# Patient Record
Sex: FEMALE | Race: WHITE | NOT HISPANIC OR LATINO | Employment: OTHER | URBAN - METROPOLITAN AREA
[De-identification: names, ages, dates, MRNs, and addresses within clinical notes are randomized per-mention and may not be internally consistent; named-entity substitution may affect disease eponyms.]

---

## 2017-01-10 DIAGNOSIS — E78.2 MIXED HYPERLIPIDEMIA: ICD-10-CM

## 2017-01-10 DIAGNOSIS — R80.9 MICROALBUMINURIA: ICD-10-CM

## 2017-01-10 DIAGNOSIS — K21.9 GASTROESOPHAGEAL REFLUX DISEASE WITHOUT ESOPHAGITIS: ICD-10-CM

## 2017-01-10 DIAGNOSIS — I10 ESSENTIAL HYPERTENSION: ICD-10-CM

## 2017-01-10 DIAGNOSIS — M17.0 OSTEOARTHRITIS OF BOTH KNEES, UNSPECIFIED OSTEOARTHRITIS TYPE: ICD-10-CM

## 2017-01-10 DIAGNOSIS — E13.9 LATENT AUTOIMMUNE DIABETES MELLITUS IN ADULTS (HCC): ICD-10-CM

## 2017-01-10 DIAGNOSIS — M47.896 OTHER OSTEOARTHRITIS OF SPINE, LUMBAR REGION: ICD-10-CM

## 2017-01-10 RX ORDER — ETODOLAC 400 MG/1
400 TABLET, EXTENDED RELEASE ORAL DAILY
Qty: 90 TABLET | Refills: 1 | Status: SHIPPED | OUTPATIENT
Start: 2017-01-10 | End: 2017-06-06 | Stop reason: SDUPTHER

## 2017-02-23 ENCOUNTER — OFFICE VISIT (OUTPATIENT)
Dept: ENDOCRINOLOGY | Age: 67
End: 2017-02-23

## 2017-02-23 VITALS
DIASTOLIC BLOOD PRESSURE: 66 MMHG | HEART RATE: 98 BPM | HEIGHT: 62 IN | SYSTOLIC BLOOD PRESSURE: 122 MMHG | OXYGEN SATURATION: 97 % | BODY MASS INDEX: 43.17 KG/M2 | WEIGHT: 234.6 LBS

## 2017-02-23 DIAGNOSIS — R80.9 MICROALBUMINURIA: ICD-10-CM

## 2017-02-23 DIAGNOSIS — E78.5 HYPERLIPIDEMIA, UNSPECIFIED HYPERLIPIDEMIA TYPE: ICD-10-CM

## 2017-02-23 DIAGNOSIS — E55.9 VITAMIN D DEFICIENCY: ICD-10-CM

## 2017-02-23 DIAGNOSIS — I10 ESSENTIAL HYPERTENSION: ICD-10-CM

## 2017-02-23 DIAGNOSIS — K31.84 GASTROPARESIS: ICD-10-CM

## 2017-02-23 DIAGNOSIS — K21.9 GASTROESOPHAGEAL REFLUX DISEASE WITHOUT ESOPHAGITIS: ICD-10-CM

## 2017-02-23 DIAGNOSIS — E13.9 LATENT AUTOIMMUNE DIABETES MELLITUS IN ADULTS (HCC): Primary | ICD-10-CM

## 2017-02-23 PROCEDURE — 99214 OFFICE O/P EST MOD 30 MIN: CPT | Performed by: INTERNAL MEDICINE

## 2017-02-23 RX ORDER — CHOLECALCIFEROL (VITAMIN D3) 125 MCG
CAPSULE ORAL
COMMUNITY
End: 2017-02-24 | Stop reason: DRUGHIGH

## 2017-02-23 NOTE — PROGRESS NOTES
Subjective   Eddie D Parmer is a 66 y.o. female.     HPI Comments: F/u for ANNI, hyperlipidemia  Testing bs 2 x day / last dm eye exam feb 2016/ last dm foot exam today with dr Dorman     Diabetes     Hyperlipidemia        Patient is a 66-year-old female came in for follow-up. She has known diabetes mellitus and was recently found to have elevated MATT antibody. She was started on insulin in 2013 and is presently on Levemir 60 units every evening and NovoLog 25 units at breakfast, 25 units at lunch, and 25 units at supper. Fasting blood sugar runs in the 200's. Suppertime blood sugar runs in 200's.  She denies any hypoglycemic episodes. She has gained 5 lbs since 10/16.  Her last meal was last night.      Her last eye examination was in 2/17 and there was no retinopathy. She has occasional numbness in her left leg when she stands a long time. She has no microalbuminuria urine sample taken last 5/16.       She has hypertension and has been on Avapro 300 mg once a day. She denies any chest pain, pedal edema or orthopnea.       She has hyperlipidemia and has been on pravastatin 80 mg once a day. She denies any myalgia.      She has acid reflux disease which is controlled by Protonix. She has been off Reglan. She was seen by Dr. Britton in the past.      She is currently being treated for depression with Cymbalta at the [facility]. She has completed 5 years of Arimidex.   Last bone density was done at her gynecologist office in 2015 and was normal.      She has chronic low back pain and arthritic pain in both knees and left shoulder. She had a previous right rotator cuff surgery. She has been advised knee replacement by Dr. Gambino. She is seeing Dr. Sander Woods for pain management and is on hydrocodone and etodolac. She had epidural injection in 4/16      She has vitamin D insufficiency and is on Vit D 2000 units/day.   She had a bone density in February 2015 and it was normal.     The following portions of  "the patient's history were reviewed and updated as appropriate: allergies, current medications, past family history, past medical history, past social history, past surgical history and problem list.    Review of Systems   Constitutional: Negative.    HENT: Negative.    Eyes: Negative.    Respiratory: Positive for wheezing.    Cardiovascular: Negative.    Gastrointestinal: Negative.    Endocrine: Negative.    Genitourinary: Negative.    Musculoskeletal: Positive for back pain, joint swelling and neck pain.   Skin: Negative.    Allergic/Immunologic: Negative.    Neurological: Negative.    Hematological: Negative.    Psychiatric/Behavioral: Negative.        Objective      Vitals:    02/23/17 1332   BP: 122/66   BP Location: Right arm   Patient Position: Sitting   Cuff Size: Large Adult   Pulse: 98   SpO2: 97%   Weight: 234 lb 9.6 oz (106 kg)   Height: 62\" (157.5 cm)     Physical Exam   Constitutional: She is oriented to person, place, and time. She appears well-developed and well-nourished. No distress.   HENT:   Head: Normocephalic.   Nose: Nose normal.   Mouth/Throat: No oropharyngeal exudate.   Eyes: Conjunctivae and EOM are normal. Right eye exhibits no discharge. Left eye exhibits no discharge. No scleral icterus.   Neck: Normal range of motion. Neck supple. No JVD present. No tracheal deviation present. No thyromegaly present.   Cardiovascular: Normal rate, regular rhythm and normal heart sounds.  Exam reveals no friction rub.    No murmur heard.  Pulmonary/Chest: Effort normal and breath sounds normal. No respiratory distress. She has no wheezes. She has no rales.   Abdominal: Soft. Bowel sounds are normal. She exhibits no distension and no mass. There is no tenderness.   Musculoskeletal: Normal range of motion. She exhibits no edema or deformity.   No plantar ulcers   Neurological: She is alert and oriented to person, place, and time. She displays normal reflexes. No cranial nerve deficit.   Intact light touch " and vibration on both lower extremities   Skin: Skin is warm and dry. No rash noted. No erythema.   Psychiatric: She has a normal mood and affect. Her behavior is normal.     Office Visit on 10/17/2016   Component Date Value Ref Range Status   • Glucose 10/17/2016 370* 65 - 99 mg/dL Final   • BUN 10/17/2016 20  8 - 23 mg/dL Final   • Creatinine 10/17/2016 1.50* 0.57 - 1.00 mg/dL Final   • eGFR Non African Am 10/17/2016 35* >60 mL/min/1.73 Final   • eGFR African Am 10/17/2016 42* >60 mL/min/1.73 Final   • BUN/Creatinine Ratio 10/17/2016 13.3  7.0 - 25.0 Final   • Sodium 10/17/2016 137  136 - 145 mmol/L Final   • Potassium 10/17/2016 5.5* 3.5 - 5.2 mmol/L Final   • Chloride 10/17/2016 96* 98 - 107 mmol/L Final   • Total CO2 10/17/2016 24.5  22.0 - 29.0 mmol/L Final   • Calcium 10/17/2016 11.0* 8.6 - 10.5 mg/dL Final   • Total Protein 10/17/2016 7.2  6.0 - 8.5 g/dL Final   • Albumin 10/17/2016 4.60  3.50 - 5.20 g/dL Final   • Globulin 10/17/2016 2.6  gm/dL Final   • A/G Ratio 10/17/2016 1.8  g/dL Final   • Total Bilirubin 10/17/2016 0.5  0.1 - 1.2 mg/dL Final   • Alkaline Phosphatase 10/17/2016 196* 39 - 117 U/L Final   • AST (SGOT) 10/17/2016 15  1 - 32 U/L Final   • ALT (SGPT) 10/17/2016 12  1 - 33 U/L Final   • Total Cholesterol 10/17/2016 200  0 - 200 mg/dL Final   • Triglycerides 10/17/2016 187* 0 - 150 mg/dL Final   • HDL Cholesterol 10/17/2016 50  40 - 60 mg/dL Final   • VLDL Cholesterol 10/17/2016 37.4  5 - 40 mg/dL Final   • LDL Cholesterol  10/17/2016 113* 0 - 100 mg/dL Final   • Hemoglobin A1C 10/17/2016 >18.90* 4.80 - 5.60 % Final    Comment: Hemoglobin A1C Ranges:  Increased Risk for Diabetes  5.7% to 6.4%  Diabetes                     >= 6.5%  Diabetic Goal                < 7.0%       Assessment/Plan   Jer was seen today for diabetes and hyperlipidemia.    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  Check blood sugar before meals and at bedtime and call with results in one week.  Continue Avapro.  Continue pravastatin.  Continue Protonix   Continue Vit D    RTC 4 mos

## 2017-02-24 DIAGNOSIS — R79.89 ELEVATED SERUM CREATININE: Primary | ICD-10-CM

## 2017-02-24 LAB
25(OH)D3+25(OH)D2 SERPL-MCNC: 21.8 NG/ML (ref 30–100)
ALBUMIN SERPL-MCNC: 4.5 G/DL (ref 3.5–5.2)
ALBUMIN/GLOB SERPL: 1.4 G/DL
ALP SERPL-CCNC: 164 U/L (ref 39–117)
ALT SERPL-CCNC: 10 U/L (ref 1–33)
AST SERPL-CCNC: 10 U/L (ref 1–32)
BILIRUB SERPL-MCNC: 0.6 MG/DL (ref 0.1–1.2)
BUN SERPL-MCNC: 29 MG/DL (ref 8–23)
BUN/CREAT SERPL: 13.9 (ref 7–25)
CALCIUM SERPL-MCNC: 10.4 MG/DL (ref 8.6–10.5)
CHLORIDE SERPL-SCNC: 100 MMOL/L (ref 98–107)
CHOLEST SERPL-MCNC: 201 MG/DL (ref 0–200)
CO2 SERPL-SCNC: 25.5 MMOL/L (ref 22–29)
CREAT SERPL-MCNC: 2.09 MG/DL (ref 0.57–1)
GLOBULIN SER CALC-MCNC: 3.2 GM/DL
GLUCOSE SERPL-MCNC: 216 MG/DL (ref 65–99)
HBA1C MFR BLD: 8.3 % (ref 4.8–5.6)
HDLC SERPL-MCNC: 56 MG/DL (ref 40–60)
LDLC SERPL CALC-MCNC: 111 MG/DL (ref 0–100)
POTASSIUM SERPL-SCNC: 4.8 MMOL/L (ref 3.5–5.2)
PROT SERPL-MCNC: 7.7 G/DL (ref 6–8.5)
SODIUM SERPL-SCNC: 143 MMOL/L (ref 136–145)
T4 FREE SERPL-MCNC: 1.21 NG/DL (ref 0.93–1.7)
TRIGL SERPL-MCNC: 168 MG/DL (ref 0–150)
TSH SERPL DL<=0.005 MIU/L-ACNC: 0.85 MIU/ML (ref 0.27–4.2)
VLDLC SERPL CALC-MCNC: 33.6 MG/DL (ref 5–40)

## 2017-02-24 RX ORDER — CHOLECALCIFEROL (VITAMIN D3) 125 MCG
5000 CAPSULE ORAL DAILY
Qty: 30 TABLET
Start: 2017-02-24 | End: 2018-02-26

## 2017-02-28 ENCOUNTER — TELEPHONE (OUTPATIENT)
Dept: ENDOCRINOLOGY | Age: 67
End: 2017-02-28

## 2017-02-28 DIAGNOSIS — M17.0 OSTEOARTHRITIS OF BOTH KNEES, UNSPECIFIED OSTEOARTHRITIS TYPE: ICD-10-CM

## 2017-02-28 DIAGNOSIS — E55.9 VITAMIN D DEFICIENCY: ICD-10-CM

## 2017-02-28 DIAGNOSIS — K21.9 GASTROESOPHAGEAL REFLUX DISEASE WITHOUT ESOPHAGITIS: ICD-10-CM

## 2017-02-28 DIAGNOSIS — R80.9 MICROALBUMINURIA: ICD-10-CM

## 2017-02-28 DIAGNOSIS — M47.896 OTHER OSTEOARTHRITIS OF SPINE, LUMBAR REGION: ICD-10-CM

## 2017-02-28 DIAGNOSIS — E13.9 LATENT AUTOIMMUNE DIABETES MELLITUS IN ADULTS (HCC): ICD-10-CM

## 2017-02-28 DIAGNOSIS — I10 ESSENTIAL HYPERTENSION: ICD-10-CM

## 2017-02-28 DIAGNOSIS — K31.84 GASTROPARESIS: ICD-10-CM

## 2017-02-28 DIAGNOSIS — E78.49 OTHER HYPERLIPIDEMIA: ICD-10-CM

## 2017-02-28 RX ORDER — IRBESARTAN 300 MG/1
300 TABLET ORAL DAILY
Qty: 90 TABLET | Refills: 1 | Status: SHIPPED | OUTPATIENT
Start: 2017-02-28 | End: 2017-10-09 | Stop reason: SDUPTHER

## 2017-02-28 RX ORDER — PRAVASTATIN SODIUM 80 MG/1
80 TABLET ORAL NIGHTLY
Qty: 90 TABLET | Refills: 1 | Status: SHIPPED | OUTPATIENT
Start: 2017-02-28 | End: 2017-08-28 | Stop reason: SDUPTHER

## 2017-02-28 NOTE — TELEPHONE ENCOUNTER
----- Message from Va JOHN Glynn sent at 2/28/2017 12:47 PM EST -----  Contact: PATIENT  PATIENT SAID [facility] CALLED HER TODAY SAYING THEY NEEDED INFO FROM PATIENT THAT DR. CAMPO SENT FOR LEVEMIR AND LANTUS.  SHE SAID DR. CAMPO INCREASED LEVEMIR FROM 60 UNITS AT BEDTIME TO 70 UNITS AT BEDTIME.  SHE SAID SHE GOT LEVEMIR SCRIPT LAST WEEK FOR 90 DAY SUPPLY.    SHE SAID DR. CAMPO DID NOT DISCUSS LANTUS WITH HER.    SHE SAID SHE IS TAKING NOVOLOG AND SUPPOSED TO TAKE  25 UNITS BREAKFAST, 25 UNITS  LUNCH, 30 UNITS  AT DINNER.   SHE ONLY EATS 2 MEALS A DAY AND IT MAY BE LUNCH WITH 25 UNITS  AND DINNER, 30 UNITS  OR BRUNCH AND TAKES 25 UNITS  AND DINNER 30 UNITS.   -[phone]

## 2017-03-13 DIAGNOSIS — E55.9 VITAMIN D DEFICIENCY: ICD-10-CM

## 2017-03-13 DIAGNOSIS — M17.0 OSTEOARTHRITIS OF BOTH KNEES, UNSPECIFIED OSTEOARTHRITIS TYPE: ICD-10-CM

## 2017-03-13 DIAGNOSIS — E78.5 HYPERLIPIDEMIA, UNSPECIFIED HYPERLIPIDEMIA TYPE: ICD-10-CM

## 2017-03-13 DIAGNOSIS — E13.9 LATENT AUTOIMMUNE DIABETES MELLITUS IN ADULTS (HCC): ICD-10-CM

## 2017-03-13 DIAGNOSIS — M47.896 OTHER OSTEOARTHRITIS OF SPINE, LUMBAR REGION: ICD-10-CM

## 2017-03-13 DIAGNOSIS — K21.9 GASTROESOPHAGEAL REFLUX DISEASE WITHOUT ESOPHAGITIS: ICD-10-CM

## 2017-03-13 DIAGNOSIS — I10 ESSENTIAL HYPERTENSION: ICD-10-CM

## 2017-03-13 DIAGNOSIS — K31.84 GASTROPARESIS: ICD-10-CM

## 2017-03-13 DIAGNOSIS — R80.9 MICROALBUMINURIA: ICD-10-CM

## 2017-03-20 ENCOUNTER — LAB (OUTPATIENT)
Dept: ENDOCRINOLOGY | Age: 67
End: 2017-03-20

## 2017-03-20 DIAGNOSIS — R79.89 ELEVATED SERUM CREATININE: ICD-10-CM

## 2017-03-20 RX ORDER — DULOXETIN HYDROCHLORIDE 60 MG/1
60 CAPSULE, DELAYED RELEASE ORAL DAILY
Qty: 90 CAPSULE | Refills: 1 | Status: SHIPPED | OUTPATIENT
Start: 2017-03-20 | End: 2017-07-22 | Stop reason: SDUPTHER

## 2017-03-21 LAB
BUN SERPL-MCNC: 23 MG/DL (ref 8–23)
BUN/CREAT SERPL: 14 (ref 7–25)
CALCIUM SERPL-MCNC: 10.1 MG/DL (ref 8.6–10.5)
CHLORIDE SERPL-SCNC: 101 MMOL/L (ref 98–107)
CO2 SERPL-SCNC: 21.8 MMOL/L (ref 22–29)
CREAT SERPL-MCNC: 1.64 MG/DL (ref 0.57–1)
GLUCOSE SERPL-MCNC: 273 MG/DL (ref 65–99)
POTASSIUM SERPL-SCNC: 4.8 MMOL/L (ref 3.5–5.2)
SODIUM SERPL-SCNC: 142 MMOL/L (ref 136–145)

## 2017-03-23 ENCOUNTER — TELEPHONE (OUTPATIENT)
Dept: ENDOCRINOLOGY | Age: 67
End: 2017-03-23

## 2017-03-23 RX ORDER — LANCETS 33 GAUGE
EACH MISCELLANEOUS
Qty: 200 EACH | Refills: 1 | Status: SHIPPED | OUTPATIENT
Start: 2017-03-23 | End: 2018-01-09 | Stop reason: SDUPTHER

## 2017-03-23 NOTE — TELEPHONE ENCOUNTER
----- Message from Daya Jude sent at 3/23/2017 12:49 PM EDT -----  Contact: patient  Needs prescription    ONE TOUCH ULTRA TEST test strip     Sig: USE AS DIRECTED TO TEST BLOOD SUGAR TWO TIMES A DAY      VAN RUGGIERO 33G misc     Sig: USE TO TEST BLOOD SUGAR TWO TIMES A DAY    [facility] [employer] [address] [place], [place] - [address] [facility] AT [address] [place] [address] - [phone]  - [phone]  [phone] (Phone)

## 2017-04-17 ENCOUNTER — TELEPHONE (OUTPATIENT)
Dept: ENDOCRINOLOGY | Age: 67
End: 2017-04-17

## 2017-04-17 DIAGNOSIS — K31.84 GASTROPARESIS: ICD-10-CM

## 2017-04-17 DIAGNOSIS — I10 ESSENTIAL HYPERTENSION: ICD-10-CM

## 2017-04-17 DIAGNOSIS — E78.5 HYPERLIPIDEMIA, UNSPECIFIED HYPERLIPIDEMIA TYPE: ICD-10-CM

## 2017-04-17 DIAGNOSIS — M47.896 OTHER OSTEOARTHRITIS OF SPINE, LUMBAR REGION: ICD-10-CM

## 2017-04-17 DIAGNOSIS — E55.9 VITAMIN D DEFICIENCY: ICD-10-CM

## 2017-04-17 DIAGNOSIS — E13.9 LATENT AUTOIMMUNE DIABETES MELLITUS IN ADULTS (HCC): ICD-10-CM

## 2017-04-17 DIAGNOSIS — R80.9 MICROALBUMINURIA: ICD-10-CM

## 2017-04-17 DIAGNOSIS — M17.0 OSTEOARTHRITIS OF BOTH KNEES, UNSPECIFIED OSTEOARTHRITIS TYPE: ICD-10-CM

## 2017-04-17 DIAGNOSIS — K21.9 GASTROESOPHAGEAL REFLUX DISEASE WITHOUT ESOPHAGITIS: ICD-10-CM

## 2017-04-17 NOTE — TELEPHONE ENCOUNTER
----- Message from Daya Roque sent at 4/17/2017 11:17 AM EDT -----  Contact: patient  novolog will need an appeal    juicealin will be covered

## 2017-04-17 NOTE — TELEPHONE ENCOUNTER
----- Message from Addie Oconnor sent at 4/17/2017 10:26 AM EDT -----  Contact: patient  Patient would like to know if she is concerned about the levomir and lantus, she wants to know which one she is suppose to use to call in a refill. Both are at the Pharmacy.

## 2017-04-25 ENCOUNTER — TELEPHONE (OUTPATIENT)
Dept: ENDOCRINOLOGY | Age: 67
End: 2017-04-25

## 2017-04-25 NOTE — TELEPHONE ENCOUNTER
----- Message from Daya Roque sent at 4/25/2017 10:27 AM EDT -----  Contact: patient  Pt can not afford the insulin yson prescribed her   humalog    Pharmacy has contacted us about what she could afford    Insurance will cover humalin 70/30  She already taken the lantus at the night    Pt needs to know what you can prescribe

## 2017-04-26 RX ORDER — NAPROXEN SODIUM 220 MG
TABLET ORAL
Qty: 400 EACH | Refills: 1 | Status: SHIPPED | OUTPATIENT
Start: 2017-04-26 | End: 2017-05-02 | Stop reason: SDUPTHER

## 2017-05-02 ENCOUNTER — TELEPHONE (OUTPATIENT)
Dept: ENDOCRINOLOGY | Age: 67
End: 2017-05-02

## 2017-05-02 RX ORDER — NAPROXEN SODIUM 220 MG
TABLET ORAL
Qty: 400 EACH | Refills: 1 | Status: SHIPPED | OUTPATIENT
Start: 2017-05-02 | End: 2017-05-16 | Stop reason: SDUPTHER

## 2017-05-08 RX ORDER — PANTOPRAZOLE SODIUM 40 MG/1
40 TABLET, DELAYED RELEASE ORAL DAILY
Qty: 90 TABLET | Refills: 0 | Status: SHIPPED | OUTPATIENT
Start: 2017-05-08 | End: 2017-07-18 | Stop reason: SDUPTHER

## 2017-05-11 ENCOUNTER — APPOINTMENT (OUTPATIENT)
Dept: WOMENS IMAGING | Facility: HOSPITAL | Age: 67
End: 2017-05-11

## 2017-05-11 PROCEDURE — 77063 BREAST TOMOSYNTHESIS BI: CPT | Performed by: RADIOLOGY

## 2017-05-11 PROCEDURE — G0202 SCR MAMMO BI INCL CAD: HCPCS | Performed by: RADIOLOGY

## 2017-05-16 RX ORDER — NAPROXEN SODIUM 220 MG
TABLET ORAL
Qty: 300 EACH | Refills: 1 | Status: SHIPPED | OUTPATIENT
Start: 2017-05-16 | End: 2018-08-06 | Stop reason: SDUPTHER

## 2017-06-05 ENCOUNTER — OFFICE VISIT (OUTPATIENT)
Dept: ENDOCRINOLOGY | Age: 67
End: 2017-06-05

## 2017-06-05 VITALS
HEART RATE: 115 BPM | WEIGHT: 233.4 LBS | OXYGEN SATURATION: 99 % | SYSTOLIC BLOOD PRESSURE: 142 MMHG | BODY MASS INDEX: 42.95 KG/M2 | DIASTOLIC BLOOD PRESSURE: 76 MMHG | HEIGHT: 62 IN

## 2017-06-05 DIAGNOSIS — E13.9 LATENT AUTOIMMUNE DIABETES MELLITUS IN ADULTS (HCC): Primary | ICD-10-CM

## 2017-06-05 DIAGNOSIS — K21.9 GASTROESOPHAGEAL REFLUX DISEASE WITHOUT ESOPHAGITIS: ICD-10-CM

## 2017-06-05 DIAGNOSIS — R80.9 MICROALBUMINURIA: ICD-10-CM

## 2017-06-05 DIAGNOSIS — I10 ESSENTIAL HYPERTENSION: ICD-10-CM

## 2017-06-05 DIAGNOSIS — E78.5 HYPERLIPIDEMIA, UNSPECIFIED HYPERLIPIDEMIA TYPE: ICD-10-CM

## 2017-06-05 DIAGNOSIS — E55.9 VITAMIN D DEFICIENCY: ICD-10-CM

## 2017-06-05 PROCEDURE — 99214 OFFICE O/P EST MOD 30 MIN: CPT | Performed by: INTERNAL MEDICINE

## 2017-06-05 RX ORDER — NYSTATIN AND TRIAMCINOLONE ACETONIDE 100000; 1 [USP'U]/G; MG/G
OINTMENT TOPICAL
Refills: 5 | COMMUNITY
Start: 2017-03-02 | End: 2017-06-05

## 2017-06-05 NOTE — PROGRESS NOTES
Subjective   Eddie D Parmer is a 67 y.o. female.     HPI Comments: F/u for ANNI ,hyperlipidemia,hypertension,depression, vitamin d def / testing bs 2 x day / last dm eye exam 2/17/ last dm foot exam 2/23/17 with dr Dorman     Diabetes     Hyperlipidemia   Associated symptoms include shortness of breath.   Hypertension   Associated symptoms include neck pain and shortness of breath.      Patient is a 66-year-old female came in for follow-up. She has known diabetes mellitus and was recently found to have elevated MATT antibody. She was started on insulin in 2013 and is on Levemir 65 units every evening and Humalog 30 units at breakfast, 30 units at lunch, and 30 units at supper. Fasting blood sugar 112-270.  Start blood sugar runs between 98 2 252.  Suppertime blood sugar runs between . She denies any hypoglycemic episodes. She has gained 5 lbs since 10/16. Her last meal was last night.      Her last eye examination was in 2/17 and has bleeding on left eye which required an injection. She has occasional numbness in her left leg when she stands a long time. She has no microalbuminuria on urine sample taken last 5/16.       She has hypertension and has been on Avapro 300 mg once a day. She denies any chest pain, pedal edema or orthopnea.       She has hyperlipidemia and has been on pravastatin 80 mg once a day. She denies any myalgia.      She has acid reflux disease which is controlled by Protonix. She has been off Reglan. She was seen by Dr. Britton in the past.      She is currently being treated for depression with Cymbalta at the [facility]. She has completed 5 years of Arimidex.  Last bone density was done at her gynecologist office in 2015 and was normal.  She is complaining of intermittent hot flashes.      She has chronic low back pain and arthritic pain in both knees and left shoulder. She had a previous right rotator cuff surgery. She has been advised knee replacement by Dr. Gambino. She is  "seeing Dr. Sander Woods for pain management and is on hydrocodone and etodolac. She had epidural injection in 4/16      She has vitamin D insufficiency and is on Vit D 2000 units/day.  She had a bone density in February 2015 and it was normal.        The following portions of the patient's history were reviewed and updated as appropriate: allergies, current medications, past family history, past medical history, past social history, past surgical history and problem list.    Review of Systems   Constitutional: Negative.    HENT: Negative.    Eyes: Positive for visual disturbance ( seeing dr Ann ).   Respiratory: Positive for shortness of breath.    Cardiovascular: Negative.    Gastrointestinal: Positive for nausea.   Endocrine: Negative.    Genitourinary: Negative.    Musculoskeletal: Positive for back pain, joint swelling and neck pain.   Skin: Negative.    Allergic/Immunologic: Negative.    Neurological: Positive for numbness ( left leg ).   Hematological: Negative.        Objective      Vitals:    06/05/17 1510   BP: 142/76   BP Location: Right arm   Patient Position: Sitting   Cuff Size: Large Adult   Pulse: 115   SpO2: 99%   Weight: 233 lb 6.4 oz (106 kg)   Height: 62\" (157.5 cm)     Physical Exam   Constitutional: She is oriented to person, place, and time. She appears well-developed and well-nourished. No distress.   HENT:   Head: Normocephalic.   Nose: Nose normal.   Mouth/Throat: No oropharyngeal exudate.   Eyes: Conjunctivae and EOM are normal. Right eye exhibits no discharge. Left eye exhibits no discharge. No scleral icterus.   Neck: Neck supple. No JVD present. No tracheal deviation present. No thyromegaly present.   Cardiovascular: Normal rate, regular rhythm, normal heart sounds and intact distal pulses.  Exam reveals no gallop and no friction rub.    No murmur heard.  Pulmonary/Chest: Effort normal and breath sounds normal. No respiratory distress. She has no wheezes. She has no rales. "   Abdominal: Soft. Bowel sounds are normal. She exhibits no distension and no mass. There is no tenderness.   Musculoskeletal: Normal range of motion. She exhibits no edema, tenderness or deformity.   Lymphadenopathy:     She has no cervical adenopathy.   Neurological: She is alert and oriented to person, place, and time. She displays normal reflexes.   Intact light touch   Skin: Skin is warm and dry.   Psychiatric: She has a normal mood and affect. Her behavior is normal.     Lab on 03/20/2017   Component Date Value Ref Range Status   • Glucose 03/20/2017 273* 65 - 99 mg/dL Final   • BUN 03/20/2017 23  8 - 23 mg/dL Final   • Creatinine 03/20/2017 1.64* 0.57 - 1.00 mg/dL Final   • eGFR Non African Am 03/20/2017 31* >60 mL/min/1.73 Final   • eGFR African Am 03/20/2017 38* >60 mL/min/1.73 Final   • BUN/Creatinine Ratio 03/20/2017 14.0  7.0 - 25.0 Final   • Sodium 03/20/2017 142  136 - 145 mmol/L Final   • Potassium 03/20/2017 4.8  3.5 - 5.2 mmol/L Final   • Chloride 03/20/2017 101  98 - 107 mmol/L Final   • Total CO2 03/20/2017 21.8* 22.0 - 29.0 mmol/L Final   • Calcium 03/20/2017 10.1  8.6 - 10.5 mg/dL Final     Assessment/Plan   Jer was seen today for diabetes, hyperlipidemia, hypertension and vitamin d deficiency.    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    Decrease Humalog to 30 units at breakfast, 25 units at lunch and 30 units at supper.  Continue Avapro 300 mg once a day.  Continue pravastatin.  Continue Protonix.  Continue Cymbalta.  Continue hydrocodone  etodolac.  Continue vitamin D.    RTC 4 mos.

## 2017-06-06 DIAGNOSIS — E78.2 MIXED HYPERLIPIDEMIA: ICD-10-CM

## 2017-06-06 DIAGNOSIS — E13.9 LATENT AUTOIMMUNE DIABETES MELLITUS IN ADULTS (HCC): ICD-10-CM

## 2017-06-06 DIAGNOSIS — M47.896 OTHER OSTEOARTHRITIS OF SPINE, LUMBAR REGION: ICD-10-CM

## 2017-06-06 DIAGNOSIS — R80.9 MICROALBUMINURIA: ICD-10-CM

## 2017-06-06 DIAGNOSIS — K21.9 GASTROESOPHAGEAL REFLUX DISEASE WITHOUT ESOPHAGITIS: ICD-10-CM

## 2017-06-06 DIAGNOSIS — I10 ESSENTIAL HYPERTENSION: ICD-10-CM

## 2017-06-06 DIAGNOSIS — M17.0 OSTEOARTHRITIS OF BOTH KNEES, UNSPECIFIED OSTEOARTHRITIS TYPE: ICD-10-CM

## 2017-06-06 LAB
25(OH)D3+25(OH)D2 SERPL-MCNC: 39.5 NG/ML (ref 30–100)
ALBUMIN SERPL-MCNC: 4.9 G/DL (ref 3.5–5.2)
ALBUMIN/CREAT UR: 27.6 MG/G CREAT (ref 0–30)
ALBUMIN/GLOB SERPL: 1.4 G/DL
ALP SERPL-CCNC: 175 U/L (ref 39–117)
ALT SERPL-CCNC: 12 U/L (ref 1–33)
AST SERPL-CCNC: 23 U/L (ref 1–32)
BILIRUB SERPL-MCNC: 0.4 MG/DL (ref 0.1–1.2)
BUN SERPL-MCNC: 22 MG/DL (ref 8–23)
BUN/CREAT SERPL: 13.3 (ref 7–25)
CALCIUM SERPL-MCNC: 11 MG/DL (ref 8.6–10.5)
CHLORIDE SERPL-SCNC: 101 MMOL/L (ref 98–107)
CHOLEST SERPL-MCNC: 186 MG/DL (ref 0–200)
CO2 SERPL-SCNC: 21.9 MMOL/L (ref 22–29)
CREAT SERPL-MCNC: 1.66 MG/DL (ref 0.57–1)
CREAT UR-MCNC: 373.3 MG/DL
GLOBULIN SER CALC-MCNC: 3.5 GM/DL
GLUCOSE SERPL-MCNC: 138 MG/DL (ref 65–99)
HBA1C MFR BLD: 6.38 % (ref 4.8–5.6)
HDLC SERPL-MCNC: 55 MG/DL (ref 40–60)
LDLC SERPL CALC-MCNC: 99 MG/DL (ref 0–100)
MICROALBUMIN UR-MCNC: 102.9 UG/ML
POTASSIUM SERPL-SCNC: 4.4 MMOL/L (ref 3.5–5.2)
PROT SERPL-MCNC: 8.4 G/DL (ref 6–8.5)
SODIUM SERPL-SCNC: 140 MMOL/L (ref 136–145)
T4 FREE SERPL-MCNC: 1.36 NG/DL (ref 0.93–1.7)
TRIGL SERPL-MCNC: 162 MG/DL (ref 0–150)
TSH SERPL DL<=0.005 MIU/L-ACNC: 1.75 MIU/ML (ref 0.27–4.2)
VLDLC SERPL CALC-MCNC: 32.4 MG/DL (ref 5–40)

## 2017-06-06 RX ORDER — ETODOLAC 400 MG/1
TABLET, EXTENDED RELEASE ORAL
Qty: 90 TABLET | Refills: 1 | Status: SHIPPED | OUTPATIENT
Start: 2017-06-06 | End: 2017-10-24 | Stop reason: SDUPTHER

## 2017-06-08 ENCOUNTER — TELEPHONE (OUTPATIENT)
Dept: ENDOCRINOLOGY | Age: 67
End: 2017-06-08

## 2017-06-08 NOTE — TELEPHONE ENCOUNTER
----- Message from Carolann Rubio sent at 6/8/2017 10:21 AM EDT -----  Contact: PATIENT  THE PATIENT WAS JUST SWITCHED TO HUMALOG. SHE IS ABOUT TO GO ON VACATION AND IS WONDERING IF SHE WILL BE ABLE TO HAVE THE OCCASIONAL LORENA WHILE TAKING THE HUMALOG. PLEASE CALL THE PATIENT TO ADVISE.

## 2017-07-18 RX ORDER — PANTOPRAZOLE SODIUM 40 MG/1
TABLET, DELAYED RELEASE ORAL
Qty: 90 TABLET | Refills: 1 | Status: SHIPPED | OUTPATIENT
Start: 2017-07-18 | End: 2017-12-25 | Stop reason: SDUPTHER

## 2017-07-24 RX ORDER — DULOXETIN HYDROCHLORIDE 60 MG/1
CAPSULE, DELAYED RELEASE ORAL
Qty: 90 CAPSULE | Refills: 1 | Status: SHIPPED | OUTPATIENT
Start: 2017-07-24 | End: 2018-02-09 | Stop reason: SDUPTHER

## 2017-08-29 RX ORDER — PRAVASTATIN SODIUM 80 MG/1
TABLET ORAL
Qty: 90 TABLET | Refills: 1 | Status: SHIPPED | OUTPATIENT
Start: 2017-08-29 | End: 2018-02-21 | Stop reason: SDUPTHER

## 2017-10-09 DIAGNOSIS — K31.84 GASTROPARESIS: ICD-10-CM

## 2017-10-09 DIAGNOSIS — M17.0 OSTEOARTHRITIS OF BOTH KNEES, UNSPECIFIED OSTEOARTHRITIS TYPE: ICD-10-CM

## 2017-10-09 DIAGNOSIS — E55.9 VITAMIN D DEFICIENCY: ICD-10-CM

## 2017-10-09 DIAGNOSIS — E13.9 LATENT AUTOIMMUNE DIABETES MELLITUS IN ADULTS (HCC): ICD-10-CM

## 2017-10-09 DIAGNOSIS — E78.49 OTHER HYPERLIPIDEMIA: ICD-10-CM

## 2017-10-09 DIAGNOSIS — M47.896 OTHER OSTEOARTHRITIS OF SPINE, LUMBAR REGION: ICD-10-CM

## 2017-10-09 DIAGNOSIS — R80.9 MICROALBUMINURIA: ICD-10-CM

## 2017-10-09 DIAGNOSIS — E78.5 HYPERLIPIDEMIA, UNSPECIFIED HYPERLIPIDEMIA TYPE: ICD-10-CM

## 2017-10-09 DIAGNOSIS — K21.9 GASTROESOPHAGEAL REFLUX DISEASE WITHOUT ESOPHAGITIS: ICD-10-CM

## 2017-10-09 DIAGNOSIS — I10 ESSENTIAL HYPERTENSION: ICD-10-CM

## 2017-10-09 RX ORDER — IRBESARTAN 300 MG/1
TABLET ORAL
Qty: 90 TABLET | Refills: 1 | Status: SHIPPED | OUTPATIENT
Start: 2017-10-09 | End: 2018-02-21 | Stop reason: SDUPTHER

## 2017-10-09 RX ORDER — MONTELUKAST SODIUM 10 MG/1
TABLET ORAL
Qty: 90 TABLET | Refills: 1 | Status: SHIPPED | OUTPATIENT
Start: 2017-10-09 | End: 2018-01-29 | Stop reason: SDUPTHER

## 2017-10-13 ENCOUNTER — OFFICE VISIT (OUTPATIENT)
Dept: ENDOCRINOLOGY | Age: 67
End: 2017-10-13

## 2017-10-13 VITALS
WEIGHT: 234.6 LBS | HEART RATE: 100 BPM | DIASTOLIC BLOOD PRESSURE: 90 MMHG | OXYGEN SATURATION: 98 % | BODY MASS INDEX: 43.17 KG/M2 | SYSTOLIC BLOOD PRESSURE: 149 MMHG | HEIGHT: 62 IN

## 2017-10-13 DIAGNOSIS — Z23 NEED FOR IMMUNIZATION AGAINST INFLUENZA: Primary | ICD-10-CM

## 2017-10-13 DIAGNOSIS — R80.9 MICROALBUMINURIA: ICD-10-CM

## 2017-10-13 DIAGNOSIS — Z11.59 ENCOUNTER FOR HEPATITIS C SCREENING TEST FOR LOW RISK PATIENT: ICD-10-CM

## 2017-10-13 DIAGNOSIS — E13.9 LATENT AUTOIMMUNE DIABETES MELLITUS IN ADULTS (HCC): Primary | ICD-10-CM

## 2017-10-13 DIAGNOSIS — I10 ESSENTIAL HYPERTENSION: ICD-10-CM

## 2017-10-13 DIAGNOSIS — J30.9 ALLERGIC RHINITIS, UNSPECIFIED CHRONICITY, UNSPECIFIED SEASONALITY, UNSPECIFIED TRIGGER: ICD-10-CM

## 2017-10-13 DIAGNOSIS — Z23 NEED FOR IMMUNIZATION AGAINST INFLUENZA: ICD-10-CM

## 2017-10-13 DIAGNOSIS — K21.9 GASTROESOPHAGEAL REFLUX DISEASE, ESOPHAGITIS PRESENCE NOT SPECIFIED: ICD-10-CM

## 2017-10-13 DIAGNOSIS — M47.896 OTHER OSTEOARTHRITIS OF SPINE, LUMBAR REGION: ICD-10-CM

## 2017-10-13 DIAGNOSIS — M48.061 SPINAL STENOSIS OF LUMBAR REGION, UNSPECIFIED WHETHER NEUROGENIC CLAUDICATION PRESENT: ICD-10-CM

## 2017-10-13 DIAGNOSIS — K21.9 GASTROESOPHAGEAL REFLUX DISEASE WITHOUT ESOPHAGITIS: ICD-10-CM

## 2017-10-13 DIAGNOSIS — E55.9 VITAMIN D DEFICIENCY: ICD-10-CM

## 2017-10-13 DIAGNOSIS — M17.0 OSTEOARTHRITIS OF BOTH KNEES, UNSPECIFIED OSTEOARTHRITIS TYPE: ICD-10-CM

## 2017-10-13 DIAGNOSIS — E78.5 HYPERLIPIDEMIA, UNSPECIFIED HYPERLIPIDEMIA TYPE: ICD-10-CM

## 2017-10-13 DIAGNOSIS — K31.84 GASTROPARESIS: ICD-10-CM

## 2017-10-13 PROCEDURE — 90471 IMMUNIZATION ADMIN: CPT | Performed by: INTERNAL MEDICINE

## 2017-10-13 PROCEDURE — 99214 OFFICE O/P EST MOD 30 MIN: CPT | Performed by: INTERNAL MEDICINE

## 2017-10-13 PROCEDURE — 90662 IIV NO PRSV INCREASED AG IM: CPT | Performed by: INTERNAL MEDICINE

## 2017-10-13 RX ORDER — CLOBETASOL PROPIONATE 0.46 MG/ML
SOLUTION TOPICAL
Refills: 3 | COMMUNITY
Start: 2017-08-17 | End: 2018-02-26

## 2017-10-13 RX ORDER — GABAPENTIN 300 MG/1
300 CAPSULE ORAL 3 TIMES DAILY
Qty: 90 CAPSULE | Refills: 2 | Status: SHIPPED | OUTPATIENT
Start: 2017-10-13 | End: 2018-01-29 | Stop reason: SDUPTHER

## 2017-10-13 RX ORDER — SYRINGE-NEEDLE,INSULIN,0.5 ML 27GX1/2"
SYRINGE, EMPTY DISPOSABLE MISCELLANEOUS
Qty: 100 EACH | Refills: 2 | Status: SHIPPED | OUTPATIENT
Start: 2017-10-13

## 2017-10-13 RX ORDER — OXYCODONE AND ACETAMINOPHEN 10; 325 MG/1; MG/1
1 TABLET ORAL EVERY 8 HOURS PRN
COMMUNITY
End: 2020-03-03

## 2017-10-13 RX ORDER — DOXYCYCLINE 100 MG/1
TABLET ORAL
Refills: 3 | COMMUNITY
Start: 2017-08-14 | End: 2017-10-13

## 2017-10-13 RX ORDER — MOMETASONE FUROATE 50 UG/1
2 SPRAY, METERED NASAL DAILY
Qty: 3 EACH | Refills: 3 | Status: SHIPPED | OUTPATIENT
Start: 2017-10-13 | End: 2019-02-12 | Stop reason: SDUPTHER

## 2017-10-13 NOTE — PROGRESS NOTES
Subjective   Eddie D Parmer is a 67 y.o. female.     HPI Comments: F/u for ANNI , hyperlipidemia, hypertension, depression, vitamin d def /testing bs 2 x day / last dm eye exam 2/17/ last dm foot exam 2-23-17 with dr Reed     Diabetes     Hyperlipidemia     Hypertension     Depression      Patient is a 67-year-old female came in for follow-up. She has known diabetes mellitus and has elevated MATT antibody. She was started on insulin in 2013 and is on Lantus 65 units every evening and Humalog 30 units at breakfast, 30 units at lunch, and 30 units at supper. Fasting blood sugar .  Lunchtime blood sugar runs between .  Suppertime blood sugar runs between . She denies any hypoglycemic episodes. She is having difficulty paying for her medication.  She has significant weight change since June 2017.. Her last meal was last night.      Her last eye examination was in 9/17 and has bleeding on left eye and last had eye injection last month. She has occasional numbness in her left lateral thigh when she stands a long time. She has no microalbuminuria on urine sample taken last 5/16.       She has degenerative spondylolisthesis disease with spinal stenosis and degenerative scoliosis.  She was seen by Dr. Sheth in November 2016 and was sent to pain management.  She is now seeing a chiropractor.    She has bilateral degenerative arthritis and was advised knee replacement by Dr. Gambino.  She is seeing Dr. Boucher for pain management and is on oxycodone and etodolac.  Her last epidural injection was in April 2016.    She has hypertension and has been on Avapro 300 mg once a day. She denies any chest pain, pedal edema or orthopnea.       She has hyperlipidemia and has been on pravastatin 80 mg once a day. She denies any myalgia.      She has acid reflux disease which is controlled by Protonix. She has been off Reglan. She was seen by Dr. Britton in the past.      She is being treated for depression with Cymbalta at the  [facility]. She has completed 5 years of Arimidex.  Last bone density was done at her gynecologist office in 2015 and was normal.  She is complaining of intermittent hot flashes.      She has vitamin D insufficiency and is on Vit D 2000 units/day.  She had a bone density in February 2015 and it was normal.    The following portions of the patient's history were reviewed and updated as appropriate: allergies, current medications, past family history, past medical history, past social history, past surgical history and problem list.    Review of Systems   Constitutional: Negative.    HENT: Negative.    Eyes: Negative.    Respiratory: Negative.    Cardiovascular: Negative.    Gastrointestinal: Negative.    Endocrine: Negative.    Genitourinary: Negative.    Musculoskeletal: Positive for back pain and joint swelling.   Skin: Negative.    Allergic/Immunologic: Negative.    Neurological: Positive for numbness ( legs ).   Hematological: Negative.    Psychiatric/Behavioral: Negative.        Objective      Vitals:    10/13/17 1130   BP: 149/90   BP Location: Right arm   Patient Position: Sitting   Cuff Size: Large Adult   Pulse: 100   SpO2: 98%   Weight: 234 lb 9.6 oz (106 kg)   Height: 62\" (157.5 cm)     Physical Exam   Constitutional: She is oriented to person, place, and time. She appears well-developed and well-nourished. No distress.   HENT:   Head: Normocephalic.   Nose: Nose normal.   Mouth/Throat: No oropharyngeal exudate.   Eyes: Conjunctivae and EOM are normal. Right eye exhibits no discharge. Left eye exhibits no discharge. No scleral icterus.   Neck: Neck supple. No JVD present. No tracheal deviation present. No thyromegaly present.   Cardiovascular: Normal rate, regular rhythm, normal heart sounds and intact distal pulses.  Exam reveals no gallop and no friction rub.    No murmur heard.  Pulmonary/Chest: Effort normal and breath sounds normal. No respiratory distress. She has no wheezes. She has no " rales.   Abdominal: Soft. Bowel sounds are normal. She exhibits no distension and no mass. There is no tenderness.   Musculoskeletal: Normal range of motion. She exhibits no edema, tenderness or deformity.   Lymphadenopathy:     She has no cervical adenopathy.   Neurological: She is alert and oriented to person, place, and time. She displays normal reflexes. Coordination normal.   Intact light touch on lower ext.  Negative SLR.     Skin: Skin is warm and dry. No rash noted. No erythema. No pallor.   Psychiatric: She has a normal mood and affect. Her behavior is normal.     Office Visit on 06/05/2017   Component Date Value Ref Range Status   • Glucose 06/05/2017 138* 65 - 99 mg/dL Final   • BUN 06/05/2017 22  8 - 23 mg/dL Final   • Creatinine 06/05/2017 1.66* 0.57 - 1.00 mg/dL Final   • eGFR Non African Am 06/05/2017 31* >60 mL/min/1.73 Final   • eGFR African Am 06/05/2017 38* >60 mL/min/1.73 Final   • BUN/Creatinine Ratio 06/05/2017 13.3  7.0 - 25.0 Final   • Sodium 06/05/2017 140  136 - 145 mmol/L Final   • Potassium 06/05/2017 4.4  3.5 - 5.2 mmol/L Final   • Chloride 06/05/2017 101  98 - 107 mmol/L Final   • Total CO2 06/05/2017 21.9* 22.0 - 29.0 mmol/L Final   • Calcium 06/05/2017 11.0* 8.6 - 10.5 mg/dL Final   • Total Protein 06/05/2017 8.4  6.0 - 8.5 g/dL Final   • Albumin 06/05/2017 4.90  3.50 - 5.20 g/dL Final   • Globulin 06/05/2017 3.5  gm/dL Final   • A/G Ratio 06/05/2017 1.4  g/dL Final   • Total Bilirubin 06/05/2017 0.4  0.1 - 1.2 mg/dL Final   • Alkaline Phosphatase 06/05/2017 175* 39 - 117 U/L Final   • AST (SGOT) 06/05/2017 23  1 - 32 U/L Final   • ALT (SGPT) 06/05/2017 12  1 - 33 U/L Final   • Total Cholesterol 06/05/2017 186  0 - 200 mg/dL Final   • Triglycerides 06/05/2017 162* 0 - 150 mg/dL Final   • HDL Cholesterol 06/05/2017 55  40 - 60 mg/dL Final   • VLDL Cholesterol 06/05/2017 32.4  5 - 40 mg/dL Final   • LDL Cholesterol  06/05/2017 99  0 - 100 mg/dL Final   • Hemoglobin A1C 06/05/2017 6.38*  "4.80 - 5.60 % Final    Comment: Hemoglobin A1C Ranges:  Increased Risk for Diabetes  5.7% to 6.4%  Diabetes                     >= 6.5%  Diabetic Goal                < 7.0%     • TSH 06/05/2017 1.750  0.270 - 4.200 mIU/mL Final   • Free T4 06/05/2017 1.36  0.93 - 1.70 ng/dL Final   • Creatinine, Urine 06/05/2017 373.3  Not Estab. mg/dL Final   • Microalbumin, Urine 06/05/2017 102.9  Not Estab. ug/mL Final   • Microalbumin/Creatinine Ratio Urine 06/05/2017 27.6  0.0 - 30.0 mg/g creat Final   • 25 Hydroxy, Vitamin D 06/05/2017 39.5  30.0 - 100.0 ng/mL Final    Comment: Reference Range for Total Vitamin D 25(OH)  Deficiency    <20.0 ng/mL  Insufficiency 21-29 ng/mL  Sufficiency    ng/mL  Toxicity      >100 ng/ml          Assessment/Plan   Jer was seen today for diabetes, hyperlipidemia, hypertension, vitamin d deficiency and depression.    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  -     Insulin Syringe-Needle U-100 (SAFETY INSULIN SYRINGES) 30G X 1/2\" 1 ML misc; Using 4 daily  -     Comprehensive Metabolic Panel  -     Hemoglobin A1c  -     Vitamin D 25 Hydroxy    Essential hypertension    Hyperlipidemia, unspecified hyperlipidemia type  -     Lipid Panel    Gastroesophageal reflux disease, esophagitis presence not specified    Gastroparesis    Spinal stenosis of lumbar region, unspecified whether neurogenic claudication present    Vitamin D deficiency  -     PTH, Intact  -     Vitamin D 25 Hydroxy    Microalbuminuria    Gastroesophageal reflux disease without esophagitis    Osteoarthritis of both knees, unspecified osteoarthritis type    Other osteoarthritis of spine, lumbar region    Allergic rhinitis, unspecified chronicity, unspecified seasonality, unspecified trigger  -     mometasone (NASONEX) 50 MCG/ACT nasal spray; 2 sprays into each nostril Daily.    Encounter for hepatitis C screening test for low risk patient  -     Hepatitis C Antibody      Continue " Lantus and Humalog.  Check hemoglobin A1c.  Continue pravastatin 80 mg once a day.  Check lipid profile.  Continue Avapro.  Continue Protonix.  Continue Cymbalta.    Gabapentin 300 mg every evening.  Follow-up with pain management.  Check vitamin D levels.     Send copy of my notes and labs to Dr. Sheth, Dr. Matthew and Dr. Sander Woods     RTC 4 mos.

## 2017-10-14 LAB
25(OH)D3+25(OH)D2 SERPL-MCNC: 44.6 NG/ML (ref 30–100)
ALBUMIN SERPL-MCNC: 4.5 G/DL (ref 3.5–5.2)
ALBUMIN/GLOB SERPL: 1.5 G/DL
ALP SERPL-CCNC: 161 U/L (ref 39–117)
ALT SERPL-CCNC: 14 U/L (ref 1–33)
AST SERPL-CCNC: 17 U/L (ref 1–32)
BILIRUB SERPL-MCNC: 0.5 MG/DL (ref 0.1–1.2)
BUN SERPL-MCNC: 26 MG/DL (ref 8–23)
BUN/CREAT SERPL: 17.4 (ref 7–25)
CALCIUM SERPL-MCNC: 10.3 MG/DL (ref 8.6–10.5)
CHLORIDE SERPL-SCNC: 107 MMOL/L (ref 98–107)
CHOLEST SERPL-MCNC: 169 MG/DL (ref 0–200)
CO2 SERPL-SCNC: 27.6 MMOL/L (ref 22–29)
CREAT SERPL-MCNC: 1.49 MG/DL (ref 0.57–1)
GLOBULIN SER CALC-MCNC: 3.1 GM/DL
GLUCOSE SERPL-MCNC: 98 MG/DL (ref 65–99)
HBA1C MFR BLD: 6.56 % (ref 4.8–5.6)
HCV AB S/CO SERPL IA: 0.1 S/CO RATIO (ref 0–0.9)
HDLC SERPL-MCNC: 66 MG/DL (ref 40–60)
LDLC SERPL CALC-MCNC: 88 MG/DL (ref 0–100)
POTASSIUM SERPL-SCNC: 4.8 MMOL/L (ref 3.5–5.2)
PROT SERPL-MCNC: 7.6 G/DL (ref 6–8.5)
PTH-INTACT SERPL-MCNC: 80 PG/ML (ref 15–65)
SODIUM SERPL-SCNC: 145 MMOL/L (ref 136–145)
TRIGL SERPL-MCNC: 75 MG/DL (ref 0–150)
VLDLC SERPL CALC-MCNC: 15 MG/DL (ref 5–40)

## 2017-10-24 DIAGNOSIS — E13.9 LATENT AUTOIMMUNE DIABETES MELLITUS IN ADULTS (HCC): ICD-10-CM

## 2017-10-24 DIAGNOSIS — I10 ESSENTIAL HYPERTENSION: ICD-10-CM

## 2017-10-24 DIAGNOSIS — K21.9 GASTROESOPHAGEAL REFLUX DISEASE WITHOUT ESOPHAGITIS: ICD-10-CM

## 2017-10-24 DIAGNOSIS — R80.9 MICROALBUMINURIA: ICD-10-CM

## 2017-10-24 DIAGNOSIS — M47.896 OTHER OSTEOARTHRITIS OF SPINE, LUMBAR REGION: ICD-10-CM

## 2017-10-24 DIAGNOSIS — E78.2 MIXED HYPERLIPIDEMIA: ICD-10-CM

## 2017-10-24 DIAGNOSIS — M17.0 OSTEOARTHRITIS OF BOTH KNEES, UNSPECIFIED OSTEOARTHRITIS TYPE: ICD-10-CM

## 2017-10-24 RX ORDER — ETODOLAC 400 MG/1
TABLET, EXTENDED RELEASE ORAL
Qty: 90 TABLET | Refills: 1 | Status: SHIPPED | OUTPATIENT
Start: 2017-10-24 | End: 2018-03-08 | Stop reason: SDUPTHER

## 2017-10-31 RX ORDER — CEFDINIR 300 MG/1
300 CAPSULE ORAL 2 TIMES DAILY
Qty: 10 CAPSULE | Refills: 0 | Status: SHIPPED | OUTPATIENT
Start: 2017-10-31 | End: 2018-02-26

## 2017-12-26 RX ORDER — PANTOPRAZOLE SODIUM 40 MG/1
TABLET, DELAYED RELEASE ORAL
Qty: 90 TABLET | Refills: 1 | Status: SHIPPED | OUTPATIENT
Start: 2017-12-26 | End: 2018-02-21 | Stop reason: SDUPTHER

## 2018-01-09 RX ORDER — LANCETS 33 GAUGE
EACH MISCELLANEOUS
Qty: 200 EACH | Refills: 1 | Status: SHIPPED | OUTPATIENT
Start: 2018-01-09 | End: 2019-01-23

## 2018-01-10 RX ORDER — LANCETS 33 GAUGE
EACH MISCELLANEOUS
Qty: 200 EACH | Refills: 1 | Status: SHIPPED | OUTPATIENT
Start: 2018-01-10 | End: 2018-02-26 | Stop reason: SDUPTHER

## 2018-01-29 DIAGNOSIS — E13.9 LATENT AUTOIMMUNE DIABETES MELLITUS IN ADULTS (HCC): ICD-10-CM

## 2018-01-29 DIAGNOSIS — K21.9 GASTROESOPHAGEAL REFLUX DISEASE WITHOUT ESOPHAGITIS: ICD-10-CM

## 2018-01-29 DIAGNOSIS — K31.84 GASTROPARESIS: ICD-10-CM

## 2018-01-29 DIAGNOSIS — M48.061 SPINAL STENOSIS OF LUMBAR REGION, UNSPECIFIED WHETHER NEUROGENIC CLAUDICATION PRESENT: ICD-10-CM

## 2018-01-29 DIAGNOSIS — I10 ESSENTIAL HYPERTENSION: ICD-10-CM

## 2018-01-29 DIAGNOSIS — M17.0 OSTEOARTHRITIS OF BOTH KNEES, UNSPECIFIED OSTEOARTHRITIS TYPE: ICD-10-CM

## 2018-01-29 DIAGNOSIS — E55.9 VITAMIN D DEFICIENCY: ICD-10-CM

## 2018-01-29 DIAGNOSIS — M47.896 OTHER OSTEOARTHRITIS OF SPINE, LUMBAR REGION: ICD-10-CM

## 2018-01-29 DIAGNOSIS — E78.5 HYPERLIPIDEMIA, UNSPECIFIED HYPERLIPIDEMIA TYPE: ICD-10-CM

## 2018-01-29 DIAGNOSIS — R80.9 MICROALBUMINURIA: ICD-10-CM

## 2018-01-29 RX ORDER — MONTELUKAST SODIUM 10 MG/1
10 TABLET ORAL NIGHTLY
Qty: 90 TABLET | Refills: 1 | Status: SHIPPED | OUTPATIENT
Start: 2018-01-29 | End: 2018-08-24 | Stop reason: SDUPTHER

## 2018-01-29 RX ORDER — GABAPENTIN 300 MG/1
300 CAPSULE ORAL 3 TIMES DAILY
Qty: 270 CAPSULE | Refills: 1 | Status: SHIPPED | OUTPATIENT
Start: 2018-01-29 | End: 2018-05-14 | Stop reason: SDUPTHER

## 2018-02-09 RX ORDER — DULOXETIN HYDROCHLORIDE 60 MG/1
CAPSULE, DELAYED RELEASE ORAL
Qty: 90 CAPSULE | Refills: 0 | Status: SHIPPED | OUTPATIENT
Start: 2018-02-09 | End: 2018-03-07 | Stop reason: SDUPTHER

## 2018-02-21 DIAGNOSIS — E55.9 VITAMIN D DEFICIENCY: ICD-10-CM

## 2018-02-21 DIAGNOSIS — I10 ESSENTIAL HYPERTENSION: ICD-10-CM

## 2018-02-21 DIAGNOSIS — K21.9 GASTROESOPHAGEAL REFLUX DISEASE WITHOUT ESOPHAGITIS: ICD-10-CM

## 2018-02-21 DIAGNOSIS — E13.9 LATENT AUTOIMMUNE DIABETES MELLITUS IN ADULTS (HCC): ICD-10-CM

## 2018-02-21 DIAGNOSIS — K31.84 GASTROPARESIS: ICD-10-CM

## 2018-02-21 DIAGNOSIS — E78.49 OTHER HYPERLIPIDEMIA: ICD-10-CM

## 2018-02-21 DIAGNOSIS — R80.9 MICROALBUMINURIA: ICD-10-CM

## 2018-02-21 DIAGNOSIS — M17.0 OSTEOARTHRITIS OF BOTH KNEES, UNSPECIFIED OSTEOARTHRITIS TYPE: ICD-10-CM

## 2018-02-21 DIAGNOSIS — M47.896 OTHER OSTEOARTHRITIS OF SPINE, LUMBAR REGION: ICD-10-CM

## 2018-02-21 RX ORDER — PRAVASTATIN SODIUM 80 MG/1
80 TABLET ORAL NIGHTLY
Qty: 90 TABLET | Refills: 1 | Status: SHIPPED | OUTPATIENT
Start: 2018-02-21 | End: 2018-08-20 | Stop reason: SDUPTHER

## 2018-02-21 RX ORDER — PANTOPRAZOLE SODIUM 40 MG/1
40 TABLET, DELAYED RELEASE ORAL DAILY
Qty: 90 TABLET | Refills: 1 | Status: SHIPPED | OUTPATIENT
Start: 2018-02-21 | End: 2018-08-24 | Stop reason: SDUPTHER

## 2018-02-21 RX ORDER — IRBESARTAN 300 MG/1
300 TABLET ORAL DAILY
Qty: 90 TABLET | Refills: 1 | Status: SHIPPED | OUTPATIENT
Start: 2018-02-21 | End: 2018-08-20 | Stop reason: SDUPTHER

## 2018-02-26 ENCOUNTER — OFFICE VISIT (OUTPATIENT)
Dept: ENDOCRINOLOGY | Age: 68
End: 2018-02-26

## 2018-02-26 VITALS
HEIGHT: 62 IN | DIASTOLIC BLOOD PRESSURE: 78 MMHG | BODY MASS INDEX: 44.05 KG/M2 | WEIGHT: 239.4 LBS | OXYGEN SATURATION: 90 % | SYSTOLIC BLOOD PRESSURE: 186 MMHG | HEART RATE: 103 BPM

## 2018-02-26 DIAGNOSIS — E55.9 VITAMIN D DEFICIENCY: ICD-10-CM

## 2018-02-26 DIAGNOSIS — M48.061 SPINAL STENOSIS OF LUMBAR REGION, UNSPECIFIED WHETHER NEUROGENIC CLAUDICATION PRESENT: ICD-10-CM

## 2018-02-26 DIAGNOSIS — K21.9 GASTROESOPHAGEAL REFLUX DISEASE, ESOPHAGITIS PRESENCE NOT SPECIFIED: ICD-10-CM

## 2018-02-26 DIAGNOSIS — M17.9 OSTEOARTHRITIS OF KNEE, UNSPECIFIED LATERALITY, UNSPECIFIED OSTEOARTHRITIS TYPE: ICD-10-CM

## 2018-02-26 DIAGNOSIS — I10 ESSENTIAL HYPERTENSION: ICD-10-CM

## 2018-02-26 DIAGNOSIS — E78.5 HYPERLIPIDEMIA, UNSPECIFIED HYPERLIPIDEMIA TYPE: ICD-10-CM

## 2018-02-26 DIAGNOSIS — E13.9 LATENT AUTOIMMUNE DIABETES MELLITUS IN ADULTS (HCC): Primary | ICD-10-CM

## 2018-02-26 PROCEDURE — 99214 OFFICE O/P EST MOD 30 MIN: CPT | Performed by: INTERNAL MEDICINE

## 2018-02-26 RX ORDER — OXYCODONE HYDROCHLORIDE AND ACETAMINOPHEN 5; 325 MG/1; MG/1
TABLET ORAL
COMMUNITY
Start: 2018-02-05 | End: 2018-02-26 | Stop reason: DRUGHIGH

## 2018-02-26 RX ORDER — TORSEMIDE 20 MG/1
TABLET ORAL
Qty: 180 TABLET | Refills: 2 | Status: SHIPPED | OUTPATIENT
Start: 2018-02-26 | End: 2018-04-03 | Stop reason: DRUGHIGH

## 2018-02-26 NOTE — PROGRESS NOTES
Subjective   Eddie D Parmer is a 67 y.o. female.     HPI Comments: F/u for ANNI,hyperlipidemia, hypertension, depression, vitamin d def/ testing bs 2 x day / last dm eye exam feb 2017/ last dm foot exam today with dr Reed     Diabetes   Hypoglycemia symptoms include nervousness/anxiousness.   Hyperlipidemia   Associated symptoms include shortness of breath.   Hypertension   Associated symptoms include neck pain and shortness of breath.      Patient is a 67-year-old female came in for follow-up. She has known diabetes mellitus and has elevated MATT antibody. She was started on insulin in 2013 and is on Lantus 70 units every evening and Humalog 32 units at breakfast, 30 units at lunch, and 30 units at supper. Fasting blood sugar .  Lunchtime blood sugar runs between .  Suppertime blood sugar runs between . She few hypoglycemic episodes. She is having difficulty paying for her medication.  She has gained 5 lbs since 10/17.. Her last meal was 1 PM.      Her last eye examination was in 2/18 and has bleeding on left eye and had left eye injection in 1/18 . She has occasional numbness in her left lateral thigh when she stands a long time.  Gabapentin 300 mg TID is not helping as much.  She has no microalbuminuria on urine sample taken last 5616.       She has degenerative spondylolisthesis disease with spinal stenosis and degenerative scoliosis.  She was seen by Dr. Sheth in November 2016 and was sent to pain management.  She is no longer seeing a chiropractor.     She has bilateral degenerative arthritis and was advised knee replacement by Dr. Gambino.  She is seeing Dr. Boucher for pain management and is on oxycodone and etodolac.  Her last epidural injection was in April 2016.  Dr. Woods will no longer take Medicare.     She has hypertension and has been on Avapro 300 mg once a day. She ran out of torsemide 20 mg several months ago.  She denies any chest pain, pedal edema or orthopnea.       She has  "hyperlipidemia and has been on pravastatin 80 mg once a day. She denies any myalgia.      She has acid reflux disease which is partly controlled by Protonix. She has been off Reglan. She was seen by Dr. Britton in the past.    The following portions of the patient's history were reviewed and updated as appropriate: allergies, current medications, past family history, past medical history, past social history, past surgical history and problem list.    Review of Systems   Constitutional: Negative.    HENT: Negative.    Eyes: Negative.    Respiratory: Positive for shortness of breath.    Cardiovascular: Negative.    Gastrointestinal: Positive for abdominal distention (bloating ).   Endocrine: Negative.    Genitourinary: Negative.    Musculoskeletal: Positive for back pain, joint swelling (hips , knees ) and neck pain.   Skin: Negative.    Allergic/Immunologic: Negative.    Neurological: Positive for numbness (left leg ).   Hematological: Negative.    Psychiatric/Behavioral: The patient is nervous/anxious.        Objective      Vitals:    02/26/18 1427   BP: (!) 186/78   BP Location: Right arm   Patient Position: Sitting   Cuff Size: Large Adult   Pulse: 103   SpO2: 90%   Weight: 109 kg (239 lb 6.4 oz)   Height: 157.5 cm (62.01\")     Physical Exam   Constitutional: She is oriented to person, place, and time. She appears well-developed and well-nourished. No distress.   HENT:   Head: Normocephalic.   Nose: Nose normal.   Mouth/Throat: No oropharyngeal exudate.   Eyes: Conjunctivae and EOM are normal. Right eye exhibits no discharge. Left eye exhibits no discharge. No scleral icterus.   Neck: Neck supple. No JVD present. No tracheal deviation present. No thyromegaly present.   Cardiovascular: Normal rate, regular rhythm, normal heart sounds and intact distal pulses.  Exam reveals no gallop and no friction rub.    No murmur heard.  Pulmonary/Chest: Effort normal and breath sounds normal. No respiratory distress. She has no " wheezes. She has no rales. She exhibits no tenderness.   Abdominal: Soft. Bowel sounds are normal. She exhibits no distension and no mass. There is no tenderness.   Musculoskeletal: Normal range of motion. She exhibits no edema, tenderness or deformity.   Lymphadenopathy:     She has no cervical adenopathy.   Neurological: She is alert and oriented to person, place, and time. Coordination normal.   Skin: Skin is warm and dry. No rash noted. No erythema. No pallor.   Psychiatric: She has a normal mood and affect. Her behavior is normal.     Office Visit on 10/13/2017   Component Date Value Ref Range Status   • Glucose 10/13/2017 98  65 - 99 mg/dL Final   • BUN 10/13/2017 26* 8 - 23 mg/dL Final   • Creatinine 10/13/2017 1.49* 0.57 - 1.00 mg/dL Final   • eGFR Non African Am 10/13/2017 35* >60 mL/min/1.73 Final   • eGFR African Am 10/13/2017 43* >60 mL/min/1.73 Final   • BUN/Creatinine Ratio 10/13/2017 17.4  7.0 - 25.0 Final   • Sodium 10/13/2017 145  136 - 145 mmol/L Final   • Potassium 10/13/2017 4.8  3.5 - 5.2 mmol/L Final   • Chloride 10/13/2017 107  98 - 107 mmol/L Final   • Total CO2 10/13/2017 27.6  22.0 - 29.0 mmol/L Final   • Calcium 10/13/2017 10.3  8.6 - 10.5 mg/dL Final   • Total Protein 10/13/2017 7.6  6.0 - 8.5 g/dL Final   • Albumin 10/13/2017 4.50  3.50 - 5.20 g/dL Final   • Globulin 10/13/2017 3.1  gm/dL Final   • A/G Ratio 10/13/2017 1.5  g/dL Final   • Total Bilirubin 10/13/2017 0.5  0.1 - 1.2 mg/dL Final   • Alkaline Phosphatase 10/13/2017 161* 39 - 117 U/L Final   • AST (SGOT) 10/13/2017 17  1 - 32 U/L Final   • ALT (SGPT) 10/13/2017 14  1 - 33 U/L Final   • Total Cholesterol 10/13/2017 169  0 - 200 mg/dL Final   • Triglycerides 10/13/2017 75  0 - 150 mg/dL Final   • HDL Cholesterol 10/13/2017 66* 40 - 60 mg/dL Final   • VLDL Cholesterol 10/13/2017 15  5 - 40 mg/dL Final   • LDL Cholesterol  10/13/2017 88  0 - 100 mg/dL Final   • Hemoglobin A1C 10/13/2017 6.56* 4.80 - 5.60 % Final    Comment:  Hemoglobin A1C Ranges:  Increased Risk for Diabetes  5.7% to 6.4%  Diabetes                     >= 6.5%  Diabetic Goal                < 7.0%     • PTH, Intact 10/13/2017 80* 15 - 65 pg/mL Final   • 25 Hydroxy, Vitamin D 10/13/2017 44.6  30.0 - 100.0 ng/mL Final    Comment: Reference Range for Total Vitamin D 25(OH)  Deficiency    <20.0 ng/mL  Insufficiency 21-29 ng/mL  Sufficiency    ng/mL  Toxicity      >100 ng/ml        • Hep C Virus Ab 10/13/2017 0.1  0.0 - 0.9 s/co ratio Final    Comment:                                   Negative:     < 0.8                               Indeterminate: 0.8 - 0.9                                    Positive:     > 0.9   The CDC recommends that a positive HCV antibody result   be followed up with a HCV Nucleic Acid Amplification   test (126165).       Assessment/Plan   Jer was seen today for diabetes, hyperlipidemia, hypertension and vitamin d deficiency.    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  -     Comprehensive Metabolic Panel  -     Hemoglobin A1c  -     TSH  -     T4, Free  -     torsemide (DEMADEX) 20 MG tablet; 2 tabs every AM    Spinal stenosis of lumbar region, unspecified whether neurogenic claudication present    Hyperlipidemia, unspecified hyperlipidemia type  -     Comprehensive Metabolic Panel  -     Lipid Panel    Essential hypertension  -     Comprehensive Metabolic Panel  -     torsemide (DEMADEX) 20 MG tablet; 2 tabs every AM    Vitamin D deficiency  -     Comprehensive Metabolic Panel    Gastroesophageal reflux disease, esophagitis presence not specified    Osteoarthritis of knee, unspecified laterality, unspecified osteoarthritis type      Continue Lantus and Humalog.  Check hemoglobin A1c.  Continue pravastatin.  Continue irbesartan 300 mg once a day.  Restart Demadex 40 mg every morning.  Continue Protonix.  Follow-up with Dr. Britton if not better  Increase gabapentin 300 mg to 2 capsules twice a  day.  Continue oxycodone per Dr. Woods  Continue etodolac.    RTC 4 mos.

## 2018-02-27 LAB
ALBUMIN SERPL-MCNC: 4.6 G/DL (ref 3.5–5.2)
ALBUMIN/GLOB SERPL: 1.4 G/DL
ALP SERPL-CCNC: 175 U/L (ref 39–117)
ALT SERPL-CCNC: 23 U/L (ref 1–33)
AST SERPL-CCNC: 22 U/L (ref 1–32)
BILIRUB SERPL-MCNC: 0.4 MG/DL (ref 0.1–1.2)
BUN SERPL-MCNC: 27 MG/DL (ref 8–23)
BUN/CREAT SERPL: 16.5 (ref 7–25)
CALCIUM SERPL-MCNC: 10.2 MG/DL (ref 8.6–10.5)
CHLORIDE SERPL-SCNC: 104 MMOL/L (ref 98–107)
CHOLEST SERPL-MCNC: 180 MG/DL (ref 0–200)
CO2 SERPL-SCNC: 22.6 MMOL/L (ref 22–29)
CREAT SERPL-MCNC: 1.64 MG/DL (ref 0.57–1)
GFR SERPLBLD CREATININE-BSD FMLA CKD-EPI: 31 ML/MIN/1.73
GFR SERPLBLD CREATININE-BSD FMLA CKD-EPI: 38 ML/MIN/1.73
GLOBULIN SER CALC-MCNC: 3.2 GM/DL
GLUCOSE SERPL-MCNC: 55 MG/DL (ref 65–99)
HBA1C MFR BLD: 7.03 % (ref 4.8–5.6)
HDLC SERPL-MCNC: 63 MG/DL (ref 40–60)
INTERPRETATION: NORMAL
LDLC SERPL CALC-MCNC: 67 MG/DL (ref 0–100)
POTASSIUM SERPL-SCNC: 5.7 MMOL/L (ref 3.5–5.2)
PROT SERPL-MCNC: 7.8 G/DL (ref 6–8.5)
SODIUM SERPL-SCNC: 141 MMOL/L (ref 136–145)
T4 FREE SERPL-MCNC: 1.11 NG/DL (ref 0.93–1.7)
TRIGL SERPL-MCNC: 249 MG/DL (ref 0–150)
TSH SERPL DL<=0.005 MIU/L-ACNC: 2.73 MIU/ML (ref 0.27–4.2)
VLDLC SERPL CALC-MCNC: 49.8 MG/DL (ref 5–40)

## 2018-03-05 DIAGNOSIS — M48.00 SPINAL STENOSIS, UNSPECIFIED SPINAL REGION: Primary | ICD-10-CM

## 2018-03-05 DIAGNOSIS — M43.10 DEGENERATIVE SPONDYLOLISTHESIS: ICD-10-CM

## 2018-03-05 DIAGNOSIS — M41.50 DEGENERATIVE SCOLIOSIS: ICD-10-CM

## 2018-03-07 DIAGNOSIS — R80.9 MICROALBUMINURIA: ICD-10-CM

## 2018-03-07 DIAGNOSIS — E78.2 MIXED HYPERLIPIDEMIA: ICD-10-CM

## 2018-03-07 DIAGNOSIS — M47.896 OTHER OSTEOARTHRITIS OF SPINE, LUMBAR REGION: ICD-10-CM

## 2018-03-07 DIAGNOSIS — M17.0 OSTEOARTHRITIS OF BOTH KNEES, UNSPECIFIED OSTEOARTHRITIS TYPE: ICD-10-CM

## 2018-03-07 DIAGNOSIS — K21.9 GASTROESOPHAGEAL REFLUX DISEASE WITHOUT ESOPHAGITIS: ICD-10-CM

## 2018-03-07 DIAGNOSIS — I10 ESSENTIAL HYPERTENSION: ICD-10-CM

## 2018-03-07 DIAGNOSIS — E13.9 LATENT AUTOIMMUNE DIABETES MELLITUS IN ADULTS (HCC): ICD-10-CM

## 2018-03-07 RX ORDER — ETODOLAC 400 MG/1
TABLET, EXTENDED RELEASE ORAL
Qty: 90 TABLET | Refills: 1 | Status: CANCELLED | OUTPATIENT
Start: 2018-03-07

## 2018-03-07 RX ORDER — DULOXETIN HYDROCHLORIDE 60 MG/1
60 CAPSULE, DELAYED RELEASE ORAL DAILY
Qty: 90 CAPSULE | Refills: 1 | Status: SHIPPED | OUTPATIENT
Start: 2018-03-07 | End: 2018-08-24 | Stop reason: SDUPTHER

## 2018-03-08 DIAGNOSIS — I10 ESSENTIAL HYPERTENSION: ICD-10-CM

## 2018-03-08 DIAGNOSIS — M47.896 OTHER OSTEOARTHRITIS OF SPINE, LUMBAR REGION: ICD-10-CM

## 2018-03-08 DIAGNOSIS — E78.2 MIXED HYPERLIPIDEMIA: ICD-10-CM

## 2018-03-08 DIAGNOSIS — E13.9 LATENT AUTOIMMUNE DIABETES MELLITUS IN ADULTS (HCC): ICD-10-CM

## 2018-03-08 DIAGNOSIS — K21.9 GASTROESOPHAGEAL REFLUX DISEASE WITHOUT ESOPHAGITIS: ICD-10-CM

## 2018-03-08 DIAGNOSIS — R80.9 MICROALBUMINURIA: ICD-10-CM

## 2018-03-08 DIAGNOSIS — M17.0 OSTEOARTHRITIS OF BOTH KNEES, UNSPECIFIED OSTEOARTHRITIS TYPE: ICD-10-CM

## 2018-03-08 RX ORDER — ETODOLAC 400 MG/1
400 TABLET, EXTENDED RELEASE ORAL DAILY
Qty: 90 TABLET | Refills: 1 | Status: SHIPPED | OUTPATIENT
Start: 2018-03-08 | End: 2018-04-05

## 2018-03-09 DIAGNOSIS — M41.50 DEGENERATIVE SCOLIOSIS: ICD-10-CM

## 2018-03-09 DIAGNOSIS — M48.061 SPINAL STENOSIS OF LUMBAR REGION, UNSPECIFIED WHETHER NEUROGENIC CLAUDICATION PRESENT: Primary | ICD-10-CM

## 2018-03-09 DIAGNOSIS — M43.10 DEGENERATIVE SPONDYLOLISTHESIS: ICD-10-CM

## 2018-03-20 ENCOUNTER — HOSPITAL ENCOUNTER (OUTPATIENT)
Dept: MRI IMAGING | Facility: HOSPITAL | Age: 68
Discharge: HOME | End: 2018-03-20
Attending: INTERNAL MEDICINE | Admitting: INTERNAL MEDICINE

## 2018-03-20 DIAGNOSIS — M48.061 SPINAL STENOSIS OF LUMBAR REGION, UNSPECIFIED WHETHER NEUROGENIC CLAUDICATION PRESENT: ICD-10-CM

## 2018-03-20 DIAGNOSIS — M43.10 DEGENERATIVE SPONDYLOLISTHESIS: ICD-10-CM

## 2018-03-20 DIAGNOSIS — M41.50 DEGENERATIVE SCOLIOSIS: ICD-10-CM

## 2018-03-20 PROCEDURE — 72148 MRI LUMBAR SPINE W/O DYE: CPT

## 2018-03-20 PROCEDURE — 82565 ASSAY OF CREATININE: CPT

## 2018-03-21 DIAGNOSIS — M48.062 SPINAL STENOSIS OF LUMBAR REGION WITH NEUROGENIC CLAUDICATION: ICD-10-CM

## 2018-03-21 DIAGNOSIS — M51.36 DEGENERATIVE DISC DISEASE, LUMBAR: Primary | ICD-10-CM

## 2018-03-21 DIAGNOSIS — R79.89 ELEVATED SERUM CREATININE: Primary | ICD-10-CM

## 2018-03-21 LAB — CREAT BLDA-MCNC: 2.6 MG/DL (ref 0.6–1.3)

## 2018-03-29 ENCOUNTER — RESULTS ENCOUNTER (OUTPATIENT)
Dept: ENDOCRINOLOGY | Age: 68
End: 2018-03-29

## 2018-03-29 DIAGNOSIS — R79.89 ELEVATED SERUM CREATININE: ICD-10-CM

## 2018-03-29 LAB
BUN SERPL-MCNC: 20 MG/DL (ref 8–23)
BUN/CREAT SERPL: 11.8 (ref 7–25)
CALCIUM SERPL-MCNC: 9.8 MG/DL (ref 8.6–10.5)
CHLORIDE SERPL-SCNC: 106 MMOL/L (ref 98–107)
CO2 SERPL-SCNC: 24.1 MMOL/L (ref 22–29)
CREAT SERPL-MCNC: 1.69 MG/DL (ref 0.57–1)
GFR SERPLBLD CREATININE-BSD FMLA CKD-EPI: 30 ML/MIN/1.73
GFR SERPLBLD CREATININE-BSD FMLA CKD-EPI: 37 ML/MIN/1.73
GLUCOSE SERPL-MCNC: 245 MG/DL (ref 65–99)
POTASSIUM SERPL-SCNC: 5.5 MMOL/L (ref 3.5–5.2)
SODIUM SERPL-SCNC: 145 MMOL/L (ref 136–145)

## 2018-04-03 DIAGNOSIS — E13.9 LATENT AUTOIMMUNE DIABETES MELLITUS IN ADULTS (HCC): ICD-10-CM

## 2018-04-03 DIAGNOSIS — I10 ESSENTIAL HYPERTENSION: ICD-10-CM

## 2018-04-03 RX ORDER — TORSEMIDE 20 MG/1
TABLET ORAL
Qty: 180 TABLET | Refills: 2
Start: 2018-04-03 | End: 2018-04-05

## 2018-04-05 ENCOUNTER — OFFICE VISIT (OUTPATIENT)
Dept: PAIN MEDICINE | Facility: CLINIC | Age: 68
End: 2018-04-05

## 2018-04-05 VITALS
HEART RATE: 111 BPM | WEIGHT: 238.8 LBS | OXYGEN SATURATION: 98 % | HEIGHT: 62 IN | BODY MASS INDEX: 43.94 KG/M2 | DIASTOLIC BLOOD PRESSURE: 76 MMHG | RESPIRATION RATE: 18 BRPM | SYSTOLIC BLOOD PRESSURE: 129 MMHG | TEMPERATURE: 97.6 F

## 2018-04-05 DIAGNOSIS — M54.41 CHRONIC BILATERAL LOW BACK PAIN WITH RIGHT-SIDED SCIATICA: Primary | ICD-10-CM

## 2018-04-05 DIAGNOSIS — G89.29 CHRONIC BILATERAL LOW BACK PAIN WITH RIGHT-SIDED SCIATICA: Primary | ICD-10-CM

## 2018-04-05 LAB
POC AMPHETAMINES: NEGATIVE
POC BARBITURATES: NEGATIVE
POC BENZODIAZEPHINES: POSITIVE
POC COCAINE: NEGATIVE
POC METHADONE: NEGATIVE
POC METHAMPHETAMINE SCREEN URINE: NEGATIVE
POC OPIATES: POSITIVE
POC OXYCODONE: POSITIVE
POC PHENCYCLIDINE: NEGATIVE
POC PROPOXYPHENE: NEGATIVE
POC THC: NEGATIVE
POC TRICYCLIC ANTIDEPRESSANTS: POSITIVE

## 2018-04-05 PROCEDURE — 80305 DRUG TEST PRSMV DIR OPT OBS: CPT | Performed by: PAIN MEDICINE

## 2018-04-05 PROCEDURE — 99204 OFFICE O/P NEW MOD 45 MIN: CPT | Performed by: PAIN MEDICINE

## 2018-04-05 NOTE — PROGRESS NOTES
CHIEF COMPLAINT: Back Pain    Eddie D Parmer is a 68 y.o. female.   He was referred here by Yovani Dorman MD. He presents to the office for evaluation and treatment of Back Pain    HPI  Back Pain  Started about 20 years ago while working at [employer]. She is now retired after 38 years of employment there. Ms. Parmer states that she has seen orthopedic spine surgeon Dr. Bello Sheth in Nov 2016 and was told she needed back surgery but she decided not to have surgery. Has not seen in several years. Has 2nd opinion with Dr. HOWARD later this month. She has previously been in pain management with Dr. Jose Alfredo Ivy and Dr. Sander Woods. Currently in pain management with Dr. Woods but paying valderrama since he doesn't take her insurance. Wants to switch provider. Has apt in April- hasn't cancelled yet until we take over medication. Takes percocet qid with some relief. No dismissals.    The patient states their pain is a 9 on a scale of 1-10.  The patient describes this pain as constant dull, ache, pressure, soreness and tender. Numbness in left thigh. The pain is located in bilateral low back and does radiate right leg, right hip. Muscle spasms in mid back - not in low back. This painful problem is aggravated by sitting, standing, walking, physical activity and movement and is alleviated by laying down, pain medication and relaxation.    Medically retired from  from shoulder and low back.     Past pain medications: percocet 5/325 mg  Gabapentin 600 mg bid    Current pain medications:   oxy/apap 7.5-325 mg 1 tab q 6 hrs    Past therapies:  Physical Therapy: yes (for shoulder pain and back pain- didn't help)  Chiropractor: yes( didn't help)  Massage Therapy: no  TENS: yes ( back pain-helped some)  Neck or back surgery: no  Past pain management: yes ( Dr. Jose Alfredo Ivy, [phone] and [facility] (2010-current)    Previous Injections: lumbar RF 2016 by Dr. Woods  Effect of Injection (%): some relief  Length of Relief: couple of  weeks     Previous Injections: several lumbar epidurals  Effect of Injection (%): didn't help much    PEG Assessment   What number best describes your pain on average in the past week? 9  What number best describes how, during the past week, pain has interfered with your enjoyment of life? 9  What number best describes how, during the past week, pain has interfered with your general activity? 10      Current Outpatient Prescriptions:   •  DULoxetine (CYMBALTA) 60 MG capsule, Take 1 capsule by mouth Daily., Disp: 90 capsule, Rfl: 1  •  gabapentin (NEURONTIN) 300 MG capsule, Take 1 capsule by mouth 3 (Three) Times a Day. (Patient taking differently: Take 600 mg by mouth 2 (Two) Times a Day.), Disp: 270 capsule, Rfl: 1  •  Insulin Glargine (LANTUS SOLOSTAR) 100 UNIT/ML injection pen, Inject 70  units at night, Disp: 65 mL, Rfl: 1  •  insulin lispro (HUMALOG) 100 UNIT/ML injection, Inject 32- units in the morning 30 units at lunch and 30 units at dinner, Disp: 81 mL, Rfl: 1  •  irbesartan (AVAPRO) 300 MG tablet, Take 1 tablet by mouth Daily., Disp: 90 tablet, Rfl: 1  •  mometasone (NASONEX) 50 MCG/ACT nasal spray, 2 sprays into each nostril Daily., Disp: 3 each, Rfl: 3  •  montelukast (SINGULAIR) 10 MG tablet, Take 1 tablet by mouth Every Night., Disp: 90 tablet, Rfl: 1  •  oxyCODONE-acetaminophen (PERCOCET) 7.5-325 MG per tablet, Take 1 tablet by mouth Every 6 (Six) Hours As Needed., Disp: , Rfl:   •  pantoprazole (PROTONIX) 40 MG EC tablet, Take 1 tablet by mouth Daily., Disp: 90 tablet, Rfl: 1  •  pravastatin (PRAVACHOL) 80 MG tablet, Take 1 tablet by mouth Every Night., Disp: 90 tablet, Rfl: 1  •  RESTASIS 0.05 % ophthalmic emulsion, , Disp: , Rfl:   •  glucose blood (ONE TOUCH ULTRA TEST) test strip, Testing bs 2 x day Dx code E10.9, Disp: 200 each, Rfl: 1  •  glucose blood test strip, ONE TOUCH ULTRA  TESTING BS 3 X DAY  DX E13.9, Disp: 300 each, Rfl: 1  •  Insulin Pen Needle 31G X 8 MM misc, USING 1 PEN NEEDLE  "MERA, Disp: 100 each, Rfl: 1  •  Insulin Syringe 31G X 5/16\" 0.5 ML misc, Using 3 syringes daily, Disp: 300 each, Rfl: 1  •  Insulin Syringe-Needle U-100 (SAFETY INSULIN SYRINGES) 30G X 1/2\" 1 ML misc, Using 4 daily, Disp: 100 each, Rfl: 2  •  ONE TOUCH ULTRA TEST test strip, Testing bs 2 x day  Dx code E13.9, Disp: 200 each, Rfl: 1  •  ONETOUCH DELICA LANCETS 33G misc, Testing bs 2 x day dx code E13.9, Disp: 200 each, Rfl: 1    REVIEW OF PERTINENT MEDICAL DATA  Chart reviewed and summarization of all medical records up to new patient visit performed.  Up to date on wellness exams. Has DM, HLD, HTN. pcp does not fill narcotic medication. tonie consistent with filling in [place].     IMAGING  MRI OF THE LUMBAR SPINE WITHOUT CONTRAST - 3/2018  IMPRESSION:  Multilevel degenerative disease as described in detail above  most prominent of which is at L4-L5 where there is a grade 1  anterolisthesis, disc desiccation and mild loss of disc height. There is  a posterior element of degenerative disease as well as a broad-based  disc osteophyte complex. Disc material extends into the neural foramen  bilaterally, more so on the right where disc material appears to abut  the undersurface of the right L4 nerve within the neural foramen  resulting in moderate-to-severe neuroforaminal compromise. Clinical  correlation is recommended. See above.    I personally reviewed the images of lumbar MRI while patient was in the office.  Findings discussed with patient.      PFSH:  The following portions of the patient's history were reviewed and updated as appropriate: problem list, past medical history, past surgery history, social history, family history, medications, and allergies    Review of Systems   Constitutional: Negative for fatigue.   HENT: Negative for congestion.    Eyes: Positive for visual disturbance (blurry vision).   Respiratory: Negative for cough, shortness of breath and wheezing.    Cardiovascular: Negative.  " "  Gastrointestinal: Negative for constipation.   Genitourinary: Negative for difficulty urinating.   Musculoskeletal: Positive for arthralgias (bilateral shoulders) and back pain.   Skin: Negative for rash and wound.   Allergic/Immunologic: Negative for immunocompromised state.   Neurological: Positive for weakness (bilateral knees) and numbness (left thigh).   Hematological: Does not bruise/bleed easily.   Psychiatric/Behavioral: Positive for sleep disturbance. Negative for suicidal ideas. The patient is not nervous/anxious.    All other systems reviewed and are negative.      Vitals:    04/05/18 1420   BP: 129/76   Pulse: 111   Resp: 18   Temp: 97.6 °F (36.4 °C)   SpO2: 98%   Weight: 108 kg (238 lb 12.8 oz)   Height: 157.5 cm (62\")   PainSc:   9   PainLoc: Back       Physical Exam   Constitutional: She appears well-developed and well-nourished. No distress.   HENT:   Head: Normocephalic and atraumatic.   Nose: Nose normal.   Mouth/Throat: Oropharynx is clear and moist.   Eyes: Conjunctivae and EOM are normal.   Neck: Normal range of motion. Neck supple.   Cardiovascular: Normal rate, regular rhythm and normal heart sounds.    Pulmonary/Chest: Effort normal and breath sounds normal. No stridor. No respiratory distress.   Abdominal: Soft. Bowel sounds are normal. She exhibits no distension. There is no tenderness.   Musculoskeletal:        Lumbar back: She exhibits decreased range of motion, tenderness and pain.   Neurological: She is alert. No cranial nerve deficit or sensory deficit.   Skin: Skin is warm and dry. No rash noted. She is not diaphoretic.   Psychiatric: She has a normal mood and affect. Her speech is normal and behavior is normal.   Nursing note and vitals reviewed.    Ortho Exam  Neurologic Exam     Mental Status   Speech: speech is normal     Cranial Nerves     CN III, IV, VI   Extraocular motions are normal.     Motor Exam 4/5 strength in BLE due to pain. equal     Gait, Coordination, and Reflexes "     Gait  Gait: shuffling (using cane)      No results found for: POCMETH, POCAMPHET, POCBARBITUR, POCBENZO, POCCOCAINE, POCMETHADO, POCOPIATES, POCOXYCODO, POCPHENCYC, POCPROPOXY, POCTHC, POCTRICYC    Comments: Reviewed POC today - +opioids, +oxy, +benxo, TCA     Date of last ASAEL reviewed : 04/05/18   Comments: Heaven Randle was seen today for back pain.    Diagnoses and all orders for this visit:    Chronic bilateral low back pain with right-sided sciatica    Other orders  -     POC Urine Drug Screen, Triage  -     Urine Drug Screen Confirmation - Urine, Urine, Clean Catch; Future      Requested Prescriptions      No prescriptions requested or ordered in this encounter     - can take over pain medication but will not increase. Long discussion about how her pain medication is not effective and would recommend decreasing. She states she would not be able to function without medication. Will leave the same but no plans change. She has been on pain medication for 14 years and has a tolerance. Increasing medication is not the answer.   - will see back in 2 weeks, before next Rx is due to be filled, and after UDS is confirmed. If consistent, can take over medication at next visit. Will see back the day before medication is due to be filled.   - will only give 2 months at a time. Given questionable abnormal UDS with Dr. Woods in past she will be under close observation with frequent UDS and pill counts.   - agree with surgical evaluation/2nd opinion. Given severe, debilitating pain with radicular symptoms that severely limit patient, highly encourage her to seek surgical options as we can not ronal her pain with increasing narcotic medication. She is very much against surgery but this is her only option for better quality of life.   - discussed her long term goals and where she wants to be and what she thinks she can do to be at those goals. Increasing pain medication does not help her reach these goal.    - Discussed with the patient regarding long-term side effects of opioids including but not limited to dependence, addiction, sedation, respiratory depression, opioid induced hormonal suppression, hyperalgesia, and elevated risk of myocardial infarction.   - will sign contract if taking over medication at next visit.     Wt Readings from Last 3 Encounters:   04/05/18 108 kg (238 lb 12.8 oz)   03/20/18 108 kg (239 lb)   02/26/18 109 kg (239 lb 6.4 oz)     Body mass index is 43.68 kg/m². Patient counseled on the importance of weight loss to help with overall health and pain control. Patient instructed to attempt weight loss.   Plan: Calorie counting cut out extra servings and reduce fast food intake    Follow-up in 4 weeks.       Siobhan Roberts MD  Pain Management

## 2018-04-09 NOTE — PROGRESS NOTES
Natalee spoke with Ms. Parmer this afternoon and informed her that her appt has been cancelled and that Dr. Roberts would not be taking over her medication management.

## 2018-04-09 NOTE — PROGRESS NOTES
I cancelled Ms. Parmer's next appt and left a message on her voicemail asking her to return my call.

## 2018-04-09 NOTE — PROGRESS NOTES
Discussed with patient at her last visit that I would only take over her pain medication if her UDS was consistent. This UDS is NOT consistent with her ASAEL. +Valium. I told her our office would contact her with the results and let her know that I am NOT going to take over her pain mediations. She has an apt with her current pain management office in 2 weeks, she should keep this apt or seek other care. Please cancel her follow up. Thank you.

## 2018-04-10 ENCOUNTER — RESULTS ENCOUNTER (OUTPATIENT)
Dept: PAIN MEDICINE | Facility: CLINIC | Age: 68
End: 2018-04-10

## 2018-04-10 DIAGNOSIS — M54.41 CHRONIC BILATERAL LOW BACK PAIN WITH RIGHT-SIDED SCIATICA: ICD-10-CM

## 2018-04-10 DIAGNOSIS — G89.29 CHRONIC BILATERAL LOW BACK PAIN WITH RIGHT-SIDED SCIATICA: ICD-10-CM

## 2018-04-12 ENCOUNTER — TELEPHONE (OUTPATIENT)
Dept: ENDOCRINOLOGY | Age: 68
End: 2018-04-12

## 2018-04-12 DIAGNOSIS — R52 PAIN: Primary | ICD-10-CM

## 2018-04-12 NOTE — TELEPHONE ENCOUNTER
----- Message from Roc Diaz sent at 4/11/2018 11:41 AM EDT -----  Contact: PATIENT   PATIENT SAID SHE HAS MISSED A CALL AND WAS CALLING YOU BACK, ADVISED I WOULD SEND A MESSAGE FOR YOU TO RETURN HER CALL.   IN REGARDS TO APPT WITH A PAIN MANAGEMENT DOCTOR  [phone]

## 2018-04-23 ENCOUNTER — TELEPHONE (OUTPATIENT)
Dept: ENDOCRINOLOGY | Age: 68
End: 2018-04-23

## 2018-04-23 NOTE — TELEPHONE ENCOUNTER
----- Message from Roc Diaz sent at 4/23/2018 11:00 AM EDT -----  Contact: PATIENT  PATIENT SAID MISS YOUR CALL AND YOU CAN CALL HER BACK WHEN YOU ARE NOT BUSY.    BEST # [phone]

## 2018-04-23 NOTE — TELEPHONE ENCOUNTER
----- Message from Sepideh Trotter sent at 4/23/2018  1:00 PM EDT -----  Contact: PATIENT     MESSAGE FROM PATIENT     MESSAGE/ISSUE:     PATIENT HAS CALLED TO LET YOU KNOW  SHE CALLED MEDICARE AND THEY SAID SHE CAN GO TO ANY PAIN MANAGEMENT WITH IN HER NETWORK. THERE WAS SO MANY SHE COULD NOT WRITE THEM ALL DOWN. SHE IS REQUESTING A CALL BACK     CALL BACK NUMBER: [phone]

## 2018-04-25 ENCOUNTER — OFFICE VISIT (OUTPATIENT)
Dept: NEUROSURGERY | Facility: CLINIC | Age: 68
End: 2018-04-25

## 2018-04-25 VITALS
DIASTOLIC BLOOD PRESSURE: 80 MMHG | WEIGHT: 239 LBS | BODY MASS INDEX: 43.98 KG/M2 | HEART RATE: 99 BPM | HEIGHT: 62 IN | SYSTOLIC BLOOD PRESSURE: 133 MMHG

## 2018-04-25 DIAGNOSIS — M48.062 SPINAL STENOSIS OF LUMBAR REGION WITH NEUROGENIC CLAUDICATION: Primary | ICD-10-CM

## 2018-04-25 PROCEDURE — 99203 OFFICE O/P NEW LOW 30 MIN: CPT | Performed by: PHYSICIAN ASSISTANT

## 2018-04-25 NOTE — PROGRESS NOTES
Subjective   Patient ID: Eddie D Parmer is a 68 y.o. female is being seen for consultation today at the request of Yovani Dorman MD  For back and bilateral leg pain R>L.  She denies any cause or injury.  She has tried ELVIS, RFA injections with Dr. Woods which only gives short relief.  Mrs. Parmer takes  Oxycodone 7.5/325 QID and Gabapentin 600 mg BID for pain.     Back Pain   This is a chronic problem. The problem occurs constantly. The problem has been gradually worsening since onset. The quality of the pain is described as aching. The pain radiates to the left thigh, right thigh, right knee and left knee. The pain is at a severity of 8/10. The pain is severe. The pain is the same all the time. The symptoms are aggravated by position and sitting. Associated symptoms include leg pain, numbness, tingling and weakness. Pertinent negatives include no bladder incontinence, bowel incontinence or perianal numbness. She has tried heat and ice (ELVIS, RFA ) for the symptoms. The treatment provided mild relief.   Leg Pain    The pain is present in the right leg, left leg and right hip. The pain is at a severity of 8/10. The pain is severe. The pain has been worsening since onset. Associated symptoms include numbness and tingling. She has tried heat and ice (ELVIS, RFA ) for the symptoms. The treatment provided mild relief.       The following portions of the patient's history were reviewed and updated as appropriate: allergies, current medications, past family history, past medical history, past social history, past surgical history and problem list.    Review of Systems   Constitutional: Positive for fatigue.   Respiratory: Positive for shortness of breath.    Gastrointestinal: Negative for bowel incontinence.   Endocrine: Positive for polydipsia.   Genitourinary: Negative for bladder incontinence and difficulty urinating.   Musculoskeletal: Positive for back pain (bilateral leg pain ), neck pain and neck stiffness.    Neurological: Positive for tingling, weakness and numbness.   Psychiatric/Behavioral: Positive for sleep disturbance.   All other systems reviewed and are negative.      Objective   Physical Exam   Constitutional: She is oriented to person, place, and time. She appears well-developed and well-nourished.   HENT:   Head: Normocephalic and atraumatic.   Right Ear: External ear normal.   Left Ear: External ear normal.   Eyes: Conjunctivae and EOM are normal. Pupils are equal, round, and reactive to light. Right eye exhibits no discharge. Left eye exhibits no discharge.   Neck: Normal range of motion. Neck supple. No tracheal deviation present.   Cardiovascular: Regular rhythm.    Pulmonary/Chest: Effort normal. No stridor. No respiratory distress.   Musculoskeletal: Normal range of motion. She exhibits no edema, tenderness or deformity.   Neurological: She is alert and oriented to person, place, and time. She has normal strength and normal reflexes. She displays no atrophy, no tremor and normal reflexes. No cranial nerve deficit or sensory deficit. She exhibits normal muscle tone. She displays a negative Romberg sign. She displays no seizure activity. Coordination and gait normal.   No long tract signs   Skin: Skin is warm and dry.   Psychiatric: She has a normal mood and affect. Her behavior is normal. Judgment and thought content normal.   Nursing note and vitals reviewed.    Neurologic Exam     Mental Status   Oriented to person, place, and time.     Cranial Nerves     CN III, IV, VI   Pupils are equal, round, and reactive to light.  Extraocular motions are normal.     Motor Exam     Strength   Strength 5/5 throughout.       Assessment/Plan   Independent Review of Radiographic Studies:    I did review the lumbar spine MRI from March 2018.  It shows multilevel disc osteophyte complexes secondary to facet arthropathy, ligamentum flavum hypertrophy and disc bulging.  There is a degenerative anterolisthesis at L4-L5  with moderate stenosis at that level and moderate to severe right greater than left foraminal narrowing.  Medical Decision Making:    Ms Parmer was referred to us by Dr. Dorman for a 15 year history of low back pain that does at times radiate into the buttocks and posterior thighs.  She was followed by Dr. Sheth for many years and was referred to Dr. Woods by Dr. Sheth.  She has had multiple injections in the past without significant relief.  She has also had physical therapy without relief.  The low back pain is the primary complaint and she describes it as constant and severe.  It worsens with any prolonged or repetitive standing or walking and improves minimally when laying down.  The leg pain is more intermittent and mild and described as an ache.  She is very clear that she is not interested in any surgical intervention.  She is followed by Dr. Dorman for her diabetes and mentioned that she was looking for a new pain management doctor.  Dr. Woods evidently does not take her insurance any longer.  She was referred to Dr. Roberts but her urine drug screen was positive for Valium which was not on her [place] and therefore Dr. Roberts was not willing to prescribe pain medications.    We discussed her MRI.  I explained that her radicular symptoms are certainly likely secondary to the stenosis and surgery could be considered for her leg pain.  However she is very clear that she is not willing to consider surgery and is only interested in pain management.  She states that Dr. Dorman is already working on a new pain management referral.  She will call as needed.  Jer was seen today for back pain and leg pain.    Diagnoses and all orders for this visit:    Spinal stenosis of lumbar region with neurogenic claudication      Return if symptoms worsen or fail to improve.

## 2018-05-14 DIAGNOSIS — M48.061 SPINAL STENOSIS OF LUMBAR REGION, UNSPECIFIED WHETHER NEUROGENIC CLAUDICATION PRESENT: ICD-10-CM

## 2018-05-14 RX ORDER — GABAPENTIN 600 MG/1
600 TABLET ORAL 2 TIMES DAILY
Qty: 180 TABLET | Refills: 1
Start: 2018-05-14 | End: 2019-02-13 | Stop reason: SDUPTHER

## 2018-05-14 RX ORDER — GABAPENTIN 300 MG/1
600 CAPSULE ORAL 2 TIMES DAILY
Qty: 120 CAPSULE | Refills: 2 | Status: SHIPPED | OUTPATIENT
Start: 2018-05-14 | End: 2018-05-14 | Stop reason: ALTCHOICE

## 2018-05-14 NOTE — TELEPHONE ENCOUNTER
----- Message from Roc Brady sent at 5/11/2018  3:31 PM EDT -----  Contact: PATIENT  PATIENT WANTS gabapentin (NEURONTIN) 300 MG capsule  (PATIENTS SAYS  Mg) NEEDS A PRESCRIPTION TO   TO SEND TO [facility] - [address] - [phone]  - [phone] (Phone)  [phone] (Fax)

## 2018-05-16 ENCOUNTER — APPOINTMENT (OUTPATIENT)
Dept: WOMENS IMAGING | Facility: HOSPITAL | Age: 68
End: 2018-05-16

## 2018-05-16 PROCEDURE — 77067 SCR MAMMO BI INCL CAD: CPT | Performed by: RADIOLOGY

## 2018-06-13 ENCOUNTER — TELEPHONE (OUTPATIENT)
Dept: ENDOCRINOLOGY | Age: 68
End: 2018-06-13

## 2018-06-13 NOTE — TELEPHONE ENCOUNTER
----- Message from Roc Diaz sent at 6/13/2018 10:51 AM EDT -----  Contact: PATIENT  PATIENT NEEDS A SCRIPT OF Insulin Glargine (LANTUS SOLOSTAR) SENT TO     [facility] - [address] - [phone] PH - [phone] (Phone)  [phone] (Fax)    PATIENT HAS A BOX LEFT AND WILL BE LEAVING TO GO OUT OF TOWN NEXT WEEK AND SHE WAS WANTING IT DELIVERED BEFORE SHE LEAVES

## 2018-07-30 ENCOUNTER — TELEPHONE (OUTPATIENT)
Dept: ENDOCRINOLOGY | Age: 68
End: 2018-07-30

## 2018-07-30 NOTE — TELEPHONE ENCOUNTER
CALLED PT BACK AND INFORMED HIM OF SAMPLES BEING HELD       ----- Message from Sepideh Trotter sent at 7/30/2018 11:37 AM EDT -----  Contact: PATIENT    PATIENT IS CALLING FOR SAMPLES    MEDICAITON:   insulin lispro (HUMALOG) 100 UNIT/ML injection  MESSAGE: PATIENT HAS CALLED IN REGARDS TO GETTING SAMPLES OF THE ABOVE MEDICATION.    PATIENT IS REQUESTING A CALL BACK IF DO OR NOT HAVE ANY SAMPLES.     CALL BACK: [phone]

## 2018-08-06 ENCOUNTER — TELEPHONE (OUTPATIENT)
Dept: ENDOCRINOLOGY | Age: 68
End: 2018-08-06

## 2018-08-06 RX ORDER — NAPROXEN SODIUM 220 MG
TABLET ORAL
Qty: 300 EACH | Refills: 1 | Status: SHIPPED | OUTPATIENT
Start: 2018-08-06 | End: 2018-10-10 | Stop reason: SDUPTHER

## 2018-08-06 NOTE — TELEPHONE ENCOUNTER
----- Message from Roc Diaz sent at 8/6/2018 11:49 AM EDT -----  Contact: PATIENT  PATIENT STATED THAT SHE NEEDS A SCRIPT OF THE BD ULTRA FINE  INSULIN SYRINGES,UF 0.5 MM X31 X 5/16TH TO BE SENT TO [facility]  for [place], [place] - [address] [place] [address] - [phone] PH - [phone] (Phone)  [phone] (Fax)    PATIENT STATED THAT SHE HAS A COUPLE OF BAGS OF THESE BUT USES 3 A DAY.  PATIENT STATED THAT SHE RECEIVED THE WRONG KIND THE OTHER DAY,

## 2018-08-20 DIAGNOSIS — E78.49 OTHER HYPERLIPIDEMIA: ICD-10-CM

## 2018-08-20 DIAGNOSIS — R80.9 MICROALBUMINURIA: ICD-10-CM

## 2018-08-20 DIAGNOSIS — E13.9 LATENT AUTOIMMUNE DIABETES MELLITUS IN ADULTS (HCC): ICD-10-CM

## 2018-08-20 DIAGNOSIS — M47.896 OTHER OSTEOARTHRITIS OF SPINE, LUMBAR REGION: ICD-10-CM

## 2018-08-20 DIAGNOSIS — M17.0 OSTEOARTHRITIS OF BOTH KNEES, UNSPECIFIED OSTEOARTHRITIS TYPE: ICD-10-CM

## 2018-08-20 DIAGNOSIS — I10 ESSENTIAL HYPERTENSION: ICD-10-CM

## 2018-08-20 DIAGNOSIS — K21.9 GASTROESOPHAGEAL REFLUX DISEASE WITHOUT ESOPHAGITIS: ICD-10-CM

## 2018-08-20 DIAGNOSIS — E55.9 VITAMIN D DEFICIENCY: ICD-10-CM

## 2018-08-20 DIAGNOSIS — K31.84 GASTROPARESIS: ICD-10-CM

## 2018-08-21 RX ORDER — IRBESARTAN 300 MG/1
TABLET ORAL
Qty: 90 TABLET | Refills: 0 | Status: SHIPPED | OUTPATIENT
Start: 2018-08-21 | End: 2018-11-04 | Stop reason: SDUPTHER

## 2018-08-21 RX ORDER — PRAVASTATIN SODIUM 80 MG/1
TABLET ORAL
Qty: 90 TABLET | Refills: 0 | Status: SHIPPED | OUTPATIENT
Start: 2018-08-21 | End: 2018-11-04 | Stop reason: SDUPTHER

## 2018-08-24 DIAGNOSIS — R80.9 MICROALBUMINURIA: ICD-10-CM

## 2018-08-24 DIAGNOSIS — E55.9 VITAMIN D DEFICIENCY: ICD-10-CM

## 2018-08-24 DIAGNOSIS — K21.9 GASTROESOPHAGEAL REFLUX DISEASE WITHOUT ESOPHAGITIS: ICD-10-CM

## 2018-08-24 DIAGNOSIS — M47.896 OTHER OSTEOARTHRITIS OF SPINE, LUMBAR REGION: ICD-10-CM

## 2018-08-24 DIAGNOSIS — E13.9 LATENT AUTOIMMUNE DIABETES MELLITUS IN ADULTS (HCC): ICD-10-CM

## 2018-08-24 DIAGNOSIS — K31.84 GASTROPARESIS: ICD-10-CM

## 2018-08-24 DIAGNOSIS — M17.0 OSTEOARTHRITIS OF BOTH KNEES, UNSPECIFIED OSTEOARTHRITIS TYPE: ICD-10-CM

## 2018-08-24 DIAGNOSIS — I10 ESSENTIAL HYPERTENSION: ICD-10-CM

## 2018-08-24 DIAGNOSIS — E78.5 HYPERLIPIDEMIA, UNSPECIFIED HYPERLIPIDEMIA TYPE: ICD-10-CM

## 2018-08-24 RX ORDER — PANTOPRAZOLE SODIUM 40 MG/1
TABLET, DELAYED RELEASE ORAL
Qty: 90 TABLET | Refills: 0 | Status: SHIPPED | OUTPATIENT
Start: 2018-08-24 | End: 2018-11-04 | Stop reason: SDUPTHER

## 2018-08-24 RX ORDER — MONTELUKAST SODIUM 10 MG/1
TABLET ORAL
Qty: 90 TABLET | Refills: 0 | Status: SHIPPED | OUTPATIENT
Start: 2018-08-24 | End: 2018-12-22 | Stop reason: SDUPTHER

## 2018-08-24 RX ORDER — DULOXETIN HYDROCHLORIDE 60 MG/1
CAPSULE, DELAYED RELEASE ORAL
Qty: 90 CAPSULE | Refills: 0 | Status: SHIPPED | OUTPATIENT
Start: 2018-08-24 | End: 2018-12-22 | Stop reason: SDUPTHER

## 2018-10-10 ENCOUNTER — OFFICE VISIT (OUTPATIENT)
Dept: ENDOCRINOLOGY | Age: 68
End: 2018-10-10

## 2018-10-10 VITALS
OXYGEN SATURATION: 98 % | BODY MASS INDEX: 42.58 KG/M2 | DIASTOLIC BLOOD PRESSURE: 76 MMHG | SYSTOLIC BLOOD PRESSURE: 148 MMHG | HEART RATE: 101 BPM | WEIGHT: 231.4 LBS | HEIGHT: 62 IN

## 2018-10-10 DIAGNOSIS — K21.9 GASTROESOPHAGEAL REFLUX DISEASE, ESOPHAGITIS PRESENCE NOT SPECIFIED: ICD-10-CM

## 2018-10-10 DIAGNOSIS — R80.9 MICROALBUMINURIA: ICD-10-CM

## 2018-10-10 DIAGNOSIS — I10 ESSENTIAL HYPERTENSION: ICD-10-CM

## 2018-10-10 DIAGNOSIS — E78.5 HYPERLIPIDEMIA, UNSPECIFIED HYPERLIPIDEMIA TYPE: ICD-10-CM

## 2018-10-10 DIAGNOSIS — J30.9 ALLERGIC RHINITIS, UNSPECIFIED SEASONALITY, UNSPECIFIED TRIGGER: ICD-10-CM

## 2018-10-10 DIAGNOSIS — E13.9 LATENT AUTOIMMUNE DIABETES MELLITUS IN ADULTS (HCC): Primary | ICD-10-CM

## 2018-10-10 DIAGNOSIS — M47.816 OSTEOARTHRITIS OF LUMBAR SPINE, UNSPECIFIED SPINAL OSTEOARTHRITIS COMPLICATION STATUS: ICD-10-CM

## 2018-10-10 PROCEDURE — 99214 OFFICE O/P EST MOD 30 MIN: CPT | Performed by: INTERNAL MEDICINE

## 2018-10-10 RX ORDER — MELOXICAM 7.5 MG/1
7.5 TABLET ORAL 2 TIMES DAILY
COMMUNITY
End: 2020-03-11

## 2018-10-10 RX ORDER — MOMETASONE FUROATE 50 UG/1
2 SPRAY, METERED NASAL DAILY
Qty: 3 EACH | Refills: 2 | Status: SHIPPED | OUTPATIENT
Start: 2018-10-10 | End: 2019-10-10

## 2018-10-10 RX ORDER — OXYCODONE AND ACETAMINOPHEN 7.5; 325 MG/1; MG/1
1 TABLET ORAL
COMMUNITY
End: 2018-10-10 | Stop reason: SDUPTHER

## 2018-10-10 NOTE — PROGRESS NOTES
Subjective   Eddie D Parmer is a 68 y.o. female.     F/u for ANNI,hyperlipidemia , hypertension,depression,vitamin d def / testing bs 2 x day / last dm eye exam 2/18/18 with dr Ann / last dm foot exam 2/26/18 with dr Dorman       Diabetes   Hypoglycemia symptoms include nervousness/anxiousness.   Hyperlipidemia     Hypertension   Associated symptoms include neck pain.   Depression Patient presents with the following symptoms: nervousness/anxiety.       Patient is a 68-year-old female came in for follow-up. She has known diabetes mellitus and has elevated MATT antibody. She was started on insulin in 2013 and is on Lantus 70 units every evening and Humalog 32 units at breakfast, 30 units at lunch, and 32 units at supper. Fasting blood sugar 79-.  Suppertime BS [id number]. She few hypoglycemic episodes.  She has lost 8 lbs since 2/18. Her last meal was 1 PM.      Her last eye examination was in 9/18 and has bleeding on left eye and had left eye injection in 9/18 . She has occasional numbness in her left lateral thigh when she stands a long time. She is on gabapentin 600 mg BID  She has no microalbuminuria on urine sample taken last 6/17      She has degenerative spondylolisthesis disease with spinal stenosis and degenerative scoliosis.  She was seen by Dr. Sheth in November 2016.  She is now seeing Dr. Martinez.     She has bilateral degenerative arthritis and was advised knee replacement by Dr. Gambino.  She is seeing Dr. Martinez for pain management and is on oxycodone.  Her last epidural injection was in April 2016.       She has hypertension and has been on Avapro 300 mg once a day and torsemide 40 mg/day.  She denies any chest pain, pedal edema or orthopnea.       She has hyperlipidemia and has been on pravastatin 80 mg once a day. She denies any myalgia.      She has acid reflux disease which is controlled by Protonix. She has been off Reglan. She was seen by Dr. Britton in the past.    She complains of nasal congestion despite  "Singulair and Allegra.  There is no associated fever.      The following portions of the patient's history were reviewed and updated as appropriate: allergies, current medications, past family history, past medical history, past social history, past surgical history and problem list.    Review of Systems   Constitutional: Negative.    HENT: Positive for ear pain.    Eyes: Negative.    Respiratory: Negative.    Cardiovascular: Positive for leg swelling.   Gastrointestinal: Negative.    Endocrine: Negative.    Genitourinary: Negative.    Musculoskeletal: Positive for back pain, joint swelling and neck pain.   Skin: Negative.    Allergic/Immunologic: Negative.    Neurological: Positive for numbness (left thigh, radiationg from ankle to thigh ).   Hematological: Negative.    Psychiatric/Behavioral: Positive for sleep disturbance. The patient is nervous/anxious.        Objective      Vitals:    10/10/18 1400   BP: 148/76   BP Location: Right arm   Patient Position: Sitting   Cuff Size: Large Adult   Pulse: 101   SpO2: 98%   Weight: 105 kg (231 lb 6.4 oz)   Height: 157.5 cm (62.01\")     Physical Exam   Constitutional: She is oriented to person, place, and time. She appears well-developed and well-nourished. No distress.   HENT:   Head: Normocephalic.   Nose: Nose normal.   Mouth/Throat: No oropharyngeal exudate.   Eyes: Conjunctivae and EOM are normal. Right eye exhibits no discharge. Left eye exhibits no discharge. No scleral icterus.   Neck: Neck supple. No JVD present. No tracheal deviation present. No thyromegaly present.   Cardiovascular: Normal rate, regular rhythm, normal heart sounds and intact distal pulses.  Exam reveals no friction rub.    No murmur heard.  Pulmonary/Chest: Effort normal and breath sounds normal. No respiratory distress. She has no wheezes. She has no rales. She exhibits no tenderness.   Abdominal: Soft. Bowel sounds are normal. She exhibits no distension and no mass. There is no tenderness. " There is no guarding.   Musculoskeletal: Normal range of motion. She exhibits no edema (+edema), tenderness or deformity.   Lymphadenopathy:     She has no cervical adenopathy.   Neurological: She is alert and oriented to person, place, and time. She displays normal reflexes.   Intact light touch   Skin: Skin is warm and dry. No rash noted. No erythema. No pallor.   Psychiatric: She has a normal mood and affect. Her behavior is normal.     Office Visit on 04/05/2018   Component Date Value Ref Range Status   • Methamphetaine Screen, Urine 04/05/2018 Negative  Negative Final   • POC Amphetamines 04/05/2018 Negative  Negative Final   • Barbiturates Screen 04/05/2018 Negative  Negative Final   • Benzodiazepine Screen 04/05/2018 Positive  Negative Final   • Cocaine Screen 04/05/2018 Negative  Negative Final   • Methadone Screen 04/05/2018 Negative  Negative Final   • Opiate Screen 04/05/2018 Positive  Negative Final   • Oxycodone, Screen 04/05/2018 Positive  Negative Final   • Phencyclidine (PCP) Screen 04/05/2018 Negative  Negative Final   • Propoxyphene Screen 04/05/2018 Negative  Negative Final   • THC, Screen 04/05/2018 Negative  Negative Final   • Tricyclic Antidepressants Screen 04/05/2018 Positive  Negative Final     Assessment/Plan   Jer was seen today for diabetes, hyperlipidemia, hypertension, depression and vitamin d deficiency.    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)  -     Comprehensive Metabolic Panel  -     Hemoglobin A1c  -     TSH  -     Microalbumin / Creatinine Urine Ratio - Urine, Clean Catch    Hyperlipidemia, unspecified hyperlipidemia type  -     Comprehensive Metabolic Panel  -     Lipid Panel  -     TSH    Essential hypertension  -     Comprehensive Metabolic Panel    Microalbuminuria  -     Microalbumin / Creatinine Urine Ratio - Urine, Clean Catch    Gastroesophageal reflux disease, esophagitis presence not  specified    Osteoarthritis of lumbar spine, unspecified spinal osteoarthritis complication status    Allergic rhinitis, unspecified seasonality, unspecified trigger  -     mometasone (NASONEX) 50 MCG/ACT nasal spray; 2 sprays into the nostril(s) as directed by provider Daily.      Continue Lantus, and Humalog  Continue pravastatin 80 mg/day.  Continue gabapentin  Continue Avapro  Continue Protonix.  Follow-up with Dr. Sheth and Dr. Martinez.    RTC 4 mos.    Send copy of my note to Dr. Sheth and Michelle.

## 2018-10-11 LAB
ALBUMIN SERPL-MCNC: 4.6 G/DL (ref 3.5–5.2)
ALBUMIN/CREAT UR: 6.7 MG/G CREAT (ref 0–30)
ALBUMIN/GLOB SERPL: 1.6 G/DL
ALP SERPL-CCNC: 191 U/L (ref 39–117)
ALT SERPL-CCNC: 18 U/L (ref 1–33)
AST SERPL-CCNC: 19 U/L (ref 1–32)
BILIRUB SERPL-MCNC: 0.4 MG/DL (ref 0.1–1.2)
BUN SERPL-MCNC: 23 MG/DL (ref 8–23)
BUN/CREAT SERPL: 12.9 (ref 7–25)
CALCIUM SERPL-MCNC: 9.7 MG/DL (ref 8.6–10.5)
CHLORIDE SERPL-SCNC: 106 MMOL/L (ref 98–107)
CHOLEST SERPL-MCNC: 179 MG/DL (ref 0–200)
CO2 SERPL-SCNC: 24.2 MMOL/L (ref 22–29)
CREAT SERPL-MCNC: 1.78 MG/DL (ref 0.57–1)
CREAT UR-MCNC: 249.4 MG/DL
GLOBULIN SER CALC-MCNC: 2.8 GM/DL
GLUCOSE SERPL-MCNC: 166 MG/DL (ref 65–99)
HBA1C MFR BLD: 7.7 % (ref 4.8–5.6)
HDLC SERPL-MCNC: 56 MG/DL (ref 40–60)
INTERPRETATION: NORMAL
LDLC SERPL CALC-MCNC: 101 MG/DL (ref 0–100)
Lab: NORMAL
MICROALBUMIN UR-MCNC: 16.6 UG/ML
POTASSIUM SERPL-SCNC: 5.2 MMOL/L (ref 3.5–5.2)
PROT SERPL-MCNC: 7.4 G/DL (ref 6–8.5)
SODIUM SERPL-SCNC: 144 MMOL/L (ref 136–145)
TRIGL SERPL-MCNC: 108 MG/DL (ref 0–150)
TSH SERPL DL<=0.005 MIU/L-ACNC: 1.8 MIU/ML (ref 0.27–4.2)
VLDLC SERPL CALC-MCNC: 21.6 MG/DL (ref 5–40)

## 2018-10-29 ENCOUNTER — TELEPHONE (OUTPATIENT)
Dept: ENDOCRINOLOGY | Age: 68
End: 2018-10-29

## 2018-10-29 NOTE — TELEPHONE ENCOUNTER
----- Message from Candice Camara MA sent at 10/29/2018 12:49 PM EDT -----  PT is asking for samples of humalog.

## 2018-11-01 DIAGNOSIS — E13.9 LATENT AUTOIMMUNE DIABETES MELLITUS IN ADULTS (HCC): ICD-10-CM

## 2018-11-01 DIAGNOSIS — I10 ESSENTIAL HYPERTENSION: Primary | ICD-10-CM

## 2018-11-01 DIAGNOSIS — R68.89 ABNORMAL ENDOCRINE LABORATORY TEST FINDING: ICD-10-CM

## 2018-11-01 DIAGNOSIS — E78.49 OTHER HYPERLIPIDEMIA: ICD-10-CM

## 2018-11-04 DIAGNOSIS — K31.84 GASTROPARESIS: ICD-10-CM

## 2018-11-04 DIAGNOSIS — K21.9 GASTROESOPHAGEAL REFLUX DISEASE WITHOUT ESOPHAGITIS: ICD-10-CM

## 2018-11-04 DIAGNOSIS — R80.9 MICROALBUMINURIA: ICD-10-CM

## 2018-11-04 DIAGNOSIS — E13.9 LATENT AUTOIMMUNE DIABETES MELLITUS IN ADULTS (HCC): ICD-10-CM

## 2018-11-04 DIAGNOSIS — M47.896 OTHER OSTEOARTHRITIS OF SPINE, LUMBAR REGION: ICD-10-CM

## 2018-11-04 DIAGNOSIS — I10 ESSENTIAL HYPERTENSION: ICD-10-CM

## 2018-11-04 DIAGNOSIS — M17.0 OSTEOARTHRITIS OF BOTH KNEES, UNSPECIFIED OSTEOARTHRITIS TYPE: ICD-10-CM

## 2018-11-04 DIAGNOSIS — E78.49 OTHER HYPERLIPIDEMIA: ICD-10-CM

## 2018-11-04 DIAGNOSIS — E55.9 VITAMIN D DEFICIENCY: ICD-10-CM

## 2018-11-05 RX ORDER — IRBESARTAN 300 MG/1
TABLET ORAL
Qty: 90 TABLET | Refills: 1 | Status: SHIPPED | OUTPATIENT
Start: 2018-11-05 | End: 2019-05-13 | Stop reason: SDUPTHER

## 2018-11-05 RX ORDER — PRAVASTATIN SODIUM 80 MG/1
TABLET ORAL
Qty: 90 TABLET | Refills: 1 | Status: SHIPPED | OUTPATIENT
Start: 2018-11-05 | End: 2019-05-13 | Stop reason: SDUPTHER

## 2018-11-05 RX ORDER — PANTOPRAZOLE SODIUM 40 MG/1
TABLET, DELAYED RELEASE ORAL
Qty: 90 TABLET | Refills: 1 | Status: SHIPPED | OUTPATIENT
Start: 2018-11-05 | End: 2019-05-20 | Stop reason: SDUPTHER

## 2018-12-22 DIAGNOSIS — M47.896 OTHER OSTEOARTHRITIS OF SPINE, LUMBAR REGION: ICD-10-CM

## 2018-12-22 DIAGNOSIS — E55.9 VITAMIN D DEFICIENCY: ICD-10-CM

## 2018-12-22 DIAGNOSIS — K21.9 GASTROESOPHAGEAL REFLUX DISEASE WITHOUT ESOPHAGITIS: ICD-10-CM

## 2018-12-22 DIAGNOSIS — I10 ESSENTIAL HYPERTENSION: ICD-10-CM

## 2018-12-22 DIAGNOSIS — K31.84 GASTROPARESIS: ICD-10-CM

## 2018-12-22 DIAGNOSIS — M17.0 OSTEOARTHRITIS OF BOTH KNEES, UNSPECIFIED OSTEOARTHRITIS TYPE: ICD-10-CM

## 2018-12-22 DIAGNOSIS — E78.5 HYPERLIPIDEMIA, UNSPECIFIED HYPERLIPIDEMIA TYPE: ICD-10-CM

## 2018-12-22 DIAGNOSIS — E13.9 LATENT AUTOIMMUNE DIABETES MELLITUS IN ADULTS (HCC): ICD-10-CM

## 2018-12-22 DIAGNOSIS — R80.9 MICROALBUMINURIA: ICD-10-CM

## 2018-12-26 RX ORDER — DULOXETIN HYDROCHLORIDE 60 MG/1
CAPSULE, DELAYED RELEASE ORAL
Qty: 90 CAPSULE | Refills: 1 | Status: SHIPPED | OUTPATIENT
Start: 2018-12-26 | End: 2019-05-22 | Stop reason: SDUPTHER

## 2018-12-26 RX ORDER — MONTELUKAST SODIUM 10 MG/1
TABLET ORAL
Qty: 90 TABLET | Refills: 1 | Status: SHIPPED | OUTPATIENT
Start: 2018-12-26 | End: 2019-09-01 | Stop reason: SDUPTHER

## 2019-01-21 RX ORDER — LANCETS 33 GAUGE
EACH MISCELLANEOUS
Qty: 200 EACH | Refills: 0 | Status: SHIPPED | OUTPATIENT
Start: 2019-01-21 | End: 2020-04-08

## 2019-01-23 RX ORDER — LANCETS 33 GAUGE
EACH MISCELLANEOUS
Qty: 200 EACH | Refills: 1 | Status: SHIPPED | OUTPATIENT
Start: 2019-01-23 | End: 2019-02-12 | Stop reason: SDUPTHER

## 2019-01-30 ENCOUNTER — RESULTS ENCOUNTER (OUTPATIENT)
Dept: ENDOCRINOLOGY | Age: 69
End: 2019-01-30

## 2019-01-30 DIAGNOSIS — R68.89 ABNORMAL ENDOCRINE LABORATORY TEST FINDING: ICD-10-CM

## 2019-01-30 DIAGNOSIS — E78.49 OTHER HYPERLIPIDEMIA: ICD-10-CM

## 2019-01-30 DIAGNOSIS — E13.9 LATENT AUTOIMMUNE DIABETES MELLITUS IN ADULTS (HCC): ICD-10-CM

## 2019-01-30 DIAGNOSIS — I10 ESSENTIAL HYPERTENSION: ICD-10-CM

## 2019-02-12 NOTE — PROGRESS NOTES
Subjective   Eddie D Parmer is a 68 y.o. female.     F/u for ANNI,hyperlipidemia, hypertension, depression, vitamin d def/ testing bs 2 x day / last dm eye exam 2/18/18 with dr Ann / last dm foot exam today with dr Dorman / flu vaccine @[facility]        Patient is a 68-year-old female came in for follow-up. She has known diabetes mellitus and has elevated MATT antibody. She was started on insulin in 2013 and is on Lantus 70 units every evening and Humalog 32 units at breakfast, 30 units at lunch, and 32 units at supper. Fasting blood sugar - not checking.  Lunchtime blood sugar runs between 191-257.  Suppertime blood sugar runs between 115-314.  She has few hypoglycemic episodes.  She has lost 6 lbs since 10/18. Her last meal was last night.      Her last eye examination was in 1/19 and has bleeding on left eye and had left eye injection in 1/19 . She denies numbness in her hands.  She is on gabapentin 600 mg BID but ran out of it 3 weeks ago.  She has no microalbuminuria on urine sample taken last 10/18.      She has degenerative spondylolisthesis disease with spinal stenosis and degenerative scoliosis.  She was seen by Dr. Sheth in November 2016.  She is now seeing Dr. Martinez.     She has bilateral degenerative arthritis of knees and hips and was advised knee replacement by Dr. Gambino in the past.  She is seeing Dr. Martinez for pain management and is on oxycodone and meloxicam.  Her last epidural and right hip injection was in 1/19       She has hypertension and has been on Avapro 300 mg once a day and torsemide 40 mg/day.   She denies any chest pain, pedal edema or orthopnea.       She has hyperlipidemia and has been on pravastatin 80 mg once a day. She denies any myalgia.       She has acid reflux disease which is controlled by Protonix. She has been off Reglan. She was seen by Dr. Britton in the past.        The following portions of the patient's history were reviewed and updated as appropriate: allergies, current  "medications, past family history, past medical history, past social history, past surgical history and problem list.    Review of Systems   Constitutional: Negative.    HENT: Negative.    Eyes: Negative.    Respiratory: Negative.    Cardiovascular: Positive for leg swelling (ankles).   Gastrointestinal: Negative.    Endocrine: Negative.    Genitourinary: Negative.    Musculoskeletal: Positive for back pain.   Skin: Negative.    Allergic/Immunologic: Negative.    Neurological: Negative.    Hematological: Negative.    Psychiatric/Behavioral: Negative.        Objective      Vitals:    02/13/19 1435   BP: (!) 198/84   BP Location: Right arm   Patient Position: Sitting   Cuff Size: Large Adult   Pulse: 113   SpO2: 98%   Weight: 102 kg (225 lb 9.6 oz)   Height: 157.5 cm (62.01\")     Physical Exam   Constitutional: She is oriented to person, place, and time. She appears well-developed and well-nourished. No distress.   HENT:   Head: Normocephalic.   Nose: Nose normal.   Mouth/Throat: No oropharyngeal exudate.   Eyes: Conjunctivae and EOM are normal. Right eye exhibits no discharge. Left eye exhibits no discharge. No scleral icterus.   Neck: Neck supple. No JVD present. No tracheal deviation present. No thyromegaly present.   Cardiovascular: Normal rate, regular rhythm, normal heart sounds and intact distal pulses. Exam reveals no gallop and no friction rub.   No murmur heard.  Pulmonary/Chest: Effort normal and breath sounds normal. No stridor. No respiratory distress. She has no wheezes. She has no rales. She exhibits no tenderness.   Abdominal: Soft. Bowel sounds are normal. She exhibits no distension and no mass. There is no tenderness. There is no rebound and no guarding. No hernia.   Musculoskeletal: Normal range of motion. She exhibits no edema, tenderness or deformity.   Lymphadenopathy:     She has no cervical adenopathy.   Neurological: She is alert and oriented to person, place, and time. She displays normal " reflexes. Coordination normal.   Skin: Skin is warm and dry. No rash noted. No erythema. No pallor.   Psychiatric: She has a normal mood and affect. Her behavior is normal.     Office Visit on 10/10/2018   Component Date Value Ref Range Status   • Glucose 10/10/2018 166* 65 - 99 mg/dL Final   • BUN 10/10/2018 23  8 - 23 mg/dL Final   • Creatinine 10/10/2018 1.78* 0.57 - 1.00 mg/dL Final   • eGFR Non African Am 10/10/2018 29* >60 mL/min/1.73 Final   • eGFR African Am 10/10/2018 35* >60 mL/min/1.73 Final   • BUN/Creatinine Ratio 10/10/2018 12.9  7.0 - 25.0 Final   • Sodium 10/10/2018 144  136 - 145 mmol/L Final   • Potassium 10/10/2018 5.2  3.5 - 5.2 mmol/L Final   • Chloride 10/10/2018 106  98 - 107 mmol/L Final   • Total CO2 10/10/2018 24.2  22.0 - 29.0 mmol/L Final   • Calcium 10/10/2018 9.7  8.6 - 10.5 mg/dL Final   • Total Protein 10/10/2018 7.4  6.0 - 8.5 g/dL Final   • Albumin 10/10/2018 4.60  3.50 - 5.20 g/dL Final   • Globulin 10/10/2018 2.8  gm/dL Final   • A/G Ratio 10/10/2018 1.6  g/dL Final   • Total Bilirubin 10/10/2018 0.4  0.1 - 1.2 mg/dL Final   • Alkaline Phosphatase 10/10/2018 191* 39 - 117 U/L Final   • AST (SGOT) 10/10/2018 19  1 - 32 U/L Final   • ALT (SGPT) 10/10/2018 18  1 - 33 U/L Final   • Total Cholesterol 10/10/2018 179  0 - 200 mg/dL Final   • Triglycerides 10/10/2018 108  0 - 150 mg/dL Final   • HDL Cholesterol 10/10/2018 56  40 - 60 mg/dL Final   • VLDL Cholesterol 10/10/2018 21.6  5 - 40 mg/dL Final   • LDL Cholesterol  10/10/2018 101* 0 - 100 mg/dL Final   • Hemoglobin A1C 10/10/2018 7.70* 4.80 - 5.60 % Final    Comment: Hemoglobin A1C Ranges:  Increased Risk for Diabetes  5.7% to 6.4%  Diabetes                     >= 6.5%  Diabetic Goal                < 7.0%     • TSH 10/10/2018 1.800  0.270 - 4.200 mIU/mL Final   • Creatinine, Urine 10/10/2018 249.4  Not Estab. mg/dL Final   • Microalbumin, Urine 10/10/2018 16.6  Not Estab. ug/mL Final   • Microalbumin/Creatinine Ratio 10/10/2018 6.7   0.0 - 30.0 mg/g creat Final    Comment:                      Normal:                0.0 -  30.0                       Albuminuria:          31.0 - 300.0                       Clinical albuminuria:       >300.0     • Interpretation 10/10/2018 Note   Final    Supplemental report is available.   • PDF Image 10/10/2018 Not applicable   Final     Assessment/Plan   Jer was seen today for diabetes, hyperlipidemia, hypertension, depression and vitamin d deficiency.    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 (CMS/[facility])  -     Comprehensive Metabolic Panel  -     Lipid Panel  -     Hemoglobin A1c  -     TSH    Hyperlipidemia, unspecified hyperlipidemia type  -     Comprehensive Metabolic Panel  -     Lipid Panel  -     TSH    Essential hypertension  -     Comprehensive Metabolic Panel    Gastroesophageal reflux disease, esophagitis presence not specified    Vitamin D deficiency  -     Vitamin D 25 Hydroxy    Microalbuminuria  -     Comprehensive Metabolic Panel    Osteoarthritis of knee, unspecified laterality, unspecified osteoarthritis type    Other orders  -     gabapentin (NEURONTIN) 600 MG tablet; Take 1 tablet by mouth 2 (Two) Times a Day.  -     Insulin Lispro (HUMALOG KWIKPEN) 100 UNIT/ML solution pen-injector; 32 units at breakfast, 30 units at lunch, 32 units at supper.  Discontinue Humalog vial      Continue Lantus 70 units every evening.  Continue mealtime Humalog.  Check blood sugar at least twice a day and send results in 1 week.  Continue gabapentin 600 mg twice daily.  Minimize use of NSAIDs.  disccused about nephrology consultation.  Will defer pain management to Dr. Martinez  Continue Avapro and torsemide.  Continue pravastatin 80 mg/day.  Continue Protonix.    Send copy of my note to Dr. Martinez, and Dr. Sheth.    RTC 4 mos.

## 2019-02-13 ENCOUNTER — OFFICE VISIT (OUTPATIENT)
Dept: ENDOCRINOLOGY | Age: 69
End: 2019-02-13

## 2019-02-13 VITALS
WEIGHT: 225.6 LBS | DIASTOLIC BLOOD PRESSURE: 80 MMHG | BODY MASS INDEX: 41.51 KG/M2 | SYSTOLIC BLOOD PRESSURE: 130 MMHG | HEART RATE: 113 BPM | OXYGEN SATURATION: 98 % | HEIGHT: 62 IN

## 2019-02-13 DIAGNOSIS — E55.9 VITAMIN D DEFICIENCY: ICD-10-CM

## 2019-02-13 DIAGNOSIS — E78.5 HYPERLIPIDEMIA, UNSPECIFIED HYPERLIPIDEMIA TYPE: ICD-10-CM

## 2019-02-13 DIAGNOSIS — E13.9 LATENT AUTOIMMUNE DIABETES MELLITUS IN ADULTS (HCC): Primary | ICD-10-CM

## 2019-02-13 DIAGNOSIS — K21.9 GASTROESOPHAGEAL REFLUX DISEASE, ESOPHAGITIS PRESENCE NOT SPECIFIED: ICD-10-CM

## 2019-02-13 DIAGNOSIS — M17.9 OSTEOARTHRITIS OF KNEE, UNSPECIFIED LATERALITY, UNSPECIFIED OSTEOARTHRITIS TYPE: ICD-10-CM

## 2019-02-13 DIAGNOSIS — R80.9 MICROALBUMINURIA: ICD-10-CM

## 2019-02-13 DIAGNOSIS — I10 ESSENTIAL HYPERTENSION: ICD-10-CM

## 2019-02-13 PROCEDURE — 99214 OFFICE O/P EST MOD 30 MIN: CPT | Performed by: INTERNAL MEDICINE

## 2019-02-13 RX ORDER — ASCORBIC ACID 500 MG
500 TABLET ORAL DAILY
COMMUNITY
End: 2020-03-11

## 2019-02-13 RX ORDER — GABAPENTIN 600 MG/1
600 TABLET ORAL 2 TIMES DAILY
Qty: 180 TABLET | Refills: 1
Start: 2019-02-13 | End: 2019-02-13 | Stop reason: SDUPTHER

## 2019-02-13 RX ORDER — CHOLECALCIFEROL (VITAMIN D3) 125 MCG
CAPSULE ORAL DAILY
COMMUNITY

## 2019-02-13 RX ORDER — TORSEMIDE 20 MG/1
40 TABLET ORAL
Status: SHIPPED | COMMUNITY
Start: 2019-02-13

## 2019-02-13 RX ORDER — GABAPENTIN 600 MG/1
600 TABLET ORAL 2 TIMES DAILY
Qty: 180 TABLET | Refills: 1 | Status: SHIPPED | OUTPATIENT
Start: 2019-02-13 | End: 2019-08-12 | Stop reason: SDUPTHER

## 2019-02-13 RX ORDER — TORSEMIDE 20 MG/1
20 TABLET ORAL
COMMUNITY
End: 2019-02-13

## 2019-02-14 LAB
25(OH)D3+25(OH)D2 SERPL-MCNC: 13.4 NG/ML (ref 30–100)
ALBUMIN SERPL-MCNC: 4.9 G/DL (ref 3.6–4.8)
ALBUMIN/GLOB SERPL: 1.6 {RATIO} (ref 1.2–2.2)
ALP SERPL-CCNC: 182 IU/L (ref 39–117)
ALT SERPL-CCNC: 10 IU/L (ref 0–32)
AST SERPL-CCNC: 16 IU/L (ref 0–40)
BILIRUB SERPL-MCNC: 0.4 MG/DL (ref 0–1.2)
BUN SERPL-MCNC: 15 MG/DL (ref 8–27)
BUN/CREAT SERPL: 9 (ref 12–28)
CALCIUM SERPL-MCNC: 10.4 MG/DL (ref 8.7–10.3)
CHLORIDE SERPL-SCNC: 106 MMOL/L (ref 96–106)
CHOLEST SERPL-MCNC: 132 MG/DL (ref 100–199)
CO2 SERPL-SCNC: 20 MMOL/L (ref 20–29)
CREAT SERPL-MCNC: 1.66 MG/DL (ref 0.57–1)
GLOBULIN SER CALC-MCNC: 3 G/DL (ref 1.5–4.5)
GLUCOSE SERPL-MCNC: 188 MG/DL (ref 65–99)
HBA1C MFR BLD: 7.2 % (ref 4.8–5.6)
HDLC SERPL-MCNC: 48 MG/DL
INTERPRETATION: NORMAL
INTERPRETATION: NORMAL
LDLC SERPL CALC-MCNC: 63 MG/DL (ref 0–99)
Lab: NORMAL
Lab: NORMAL
POTASSIUM SERPL-SCNC: 4.6 MMOL/L (ref 3.5–5.2)
PROT SERPL-MCNC: 7.9 G/DL (ref 6–8.5)
SODIUM SERPL-SCNC: 144 MMOL/L (ref 134–144)
TRIGL SERPL-MCNC: 107 MG/DL (ref 0–149)
TSH SERPL DL<=0.005 MIU/L-ACNC: 1.27 UIU/ML (ref 0.45–4.5)
VLDLC SERPL CALC-MCNC: 21 MG/DL (ref 5–40)

## 2019-05-13 DIAGNOSIS — M47.896 OTHER OSTEOARTHRITIS OF SPINE, LUMBAR REGION: ICD-10-CM

## 2019-05-13 DIAGNOSIS — R80.9 MICROALBUMINURIA: ICD-10-CM

## 2019-05-13 DIAGNOSIS — E78.49 OTHER HYPERLIPIDEMIA: ICD-10-CM

## 2019-05-13 DIAGNOSIS — K31.84 GASTROPARESIS: ICD-10-CM

## 2019-05-13 DIAGNOSIS — K21.9 GASTROESOPHAGEAL REFLUX DISEASE WITHOUT ESOPHAGITIS: ICD-10-CM

## 2019-05-13 DIAGNOSIS — E13.9 LATENT AUTOIMMUNE DIABETES MELLITUS IN ADULTS (HCC): ICD-10-CM

## 2019-05-13 DIAGNOSIS — E55.9 VITAMIN D DEFICIENCY: ICD-10-CM

## 2019-05-13 DIAGNOSIS — I10 ESSENTIAL HYPERTENSION: ICD-10-CM

## 2019-05-13 DIAGNOSIS — M17.0 OSTEOARTHRITIS OF BOTH KNEES, UNSPECIFIED OSTEOARTHRITIS TYPE: ICD-10-CM

## 2019-05-13 RX ORDER — IRBESARTAN 300 MG/1
TABLET ORAL
Qty: 90 TABLET | Refills: 1 | Status: SHIPPED | OUTPATIENT
Start: 2019-05-13 | End: 2019-11-07 | Stop reason: SDUPTHER

## 2019-05-13 RX ORDER — PRAVASTATIN SODIUM 80 MG/1
TABLET ORAL
Qty: 90 TABLET | Refills: 1 | Status: SHIPPED | OUTPATIENT
Start: 2019-05-13 | End: 2019-11-07 | Stop reason: SDUPTHER

## 2019-05-21 RX ORDER — PANTOPRAZOLE SODIUM 40 MG/1
TABLET, DELAYED RELEASE ORAL
Qty: 90 TABLET | Refills: 1 | Status: SHIPPED | OUTPATIENT
Start: 2019-05-21 | End: 2019-12-08 | Stop reason: SDUPTHER

## 2019-05-22 RX ORDER — DULOXETIN HYDROCHLORIDE 60 MG/1
60 CAPSULE, DELAYED RELEASE ORAL DAILY
Qty: 90 CAPSULE | Refills: 1 | Status: SHIPPED | OUTPATIENT
Start: 2019-05-22 | End: 2019-12-08 | Stop reason: SDUPTHER

## 2019-05-30 ENCOUNTER — APPOINTMENT (OUTPATIENT)
Dept: WOMENS IMAGING | Facility: HOSPITAL | Age: 69
End: 2019-05-30

## 2019-05-30 PROCEDURE — 77067 SCR MAMMO BI INCL CAD: CPT | Performed by: RADIOLOGY

## 2019-05-30 PROCEDURE — 77063 BREAST TOMOSYNTHESIS BI: CPT | Performed by: RADIOLOGY

## 2019-06-25 NOTE — PROGRESS NOTES
Subjective   Eddie D Parmer is a 69 y.o. female.     F/u for ANNI, hyperlipidemia, hypertension,depression, vitamin d def / testing bs 2 x day. [facility] may 2019[facility]  / last dm foot exam 2/13/19 with dr Reed        Patient is a 68-year-old female came in for follow-up. She has known diabetes mellitus and has elevated MATT antibody. She was started on insulin in 2013 and is on Lantus 70 units every evening and Humalog 32 units at breakfast, 30 units at lunch, and 32 units at supper. BS .  She has few hypoglycemic episodes.  She has gained 6 pounds since February 2019.. Her last meal was last night.      Her last eye examination was in 5/19 and has bleeding on left eye and had left eye injection in 6/19 . She denies numbness in her hands.  She is on gabapentin 600 mg BID.  She has no microalbuminuria on urine sample taken last 10/18.      She has degenerative spondylolisthesis disease with spinal stenosis and degenerative scoliosis.  She seen Dr. Sheth and will be seeing Dr. Delgado for possible spine surgery.     She has bilateral degenerative arthritis of knees and hips and was advised knee replacement by Dr. Gambino in the past.  She is seeing Dr. Martinez for pain management and is on oxycodone and meloxicam.  Her last epidural and right hip injection was in 1/19       She has hypertension and has been on Avapro 300 mg once a day.  She takes torsemide 40 mg 1-2 tab/day as needed for edema.   She denies any chest pain, or orthopnea.       She has hyperlipidemia and has been on pravastatin 80 mg once a day. She denies any myalgia.       She has acid reflux disease which is controlled by Protonix. She has been off Reglan. She was seen by Dr. Britton in the past.    She has vitamin D deficiency and is on vitamin D3 2000 units/day.    The following portions of the patient's history were reviewed and updated as appropriate: allergies, current medications, past family history, past medical history,  "past social history, past surgical history and problem list.    Review of Systems   Constitutional: Negative.    Eyes: Negative.    Respiratory: Negative.    Cardiovascular: Positive for leg swelling.   Gastrointestinal: Negative.    Endocrine: Negative.    Genitourinary: Negative.    Musculoskeletal: Positive for back pain.   Skin: Negative.    Allergic/Immunologic: Negative.    Neurological: Positive for numbness (burning radiating down right leg ) and headaches.   Hematological: Negative.    Psychiatric/Behavioral: Negative.        Objective      Vitals:    06/26/19 1441   BP: 168/92   BP Location: Right arm   Patient Position: Sitting   Cuff Size: Large Adult   Pulse: 91   SpO2: 98%   Weight: 105 kg (231 lb)   Height: 157.5 cm (62.01\")     Physical Exam   Constitutional: She is oriented to person, place, and time. She appears well-developed and well-nourished. No distress.   HENT:   Head: Normocephalic.   Right Ear: External ear normal.   Left Ear: External ear normal.   Nose: Nose normal.   Mouth/Throat: Oropharynx is clear and moist. No oropharyngeal exudate.   Eyes: Conjunctivae and EOM are normal. Right eye exhibits no discharge. Left eye exhibits no discharge. No scleral icterus.   Neck: Neck supple. No JVD present. No tracheal deviation present. No thyromegaly present.   Cardiovascular: Normal rate, regular rhythm, normal heart sounds and intact distal pulses. Exam reveals no friction rub.   No murmur heard.  Pulmonary/Chest: Effort normal and breath sounds normal. No stridor. No respiratory distress. She has no wheezes. She has no rales. She exhibits no tenderness.   Abdominal: Soft. Bowel sounds are normal. She exhibits no distension and no mass. There is no tenderness. There is no guarding.   Musculoskeletal: Normal range of motion. She exhibits edema (+1 edema). She exhibits no deformity.   Lymphadenopathy:     She has no cervical adenopathy.   Neurological: She is alert and oriented to person, place, " and time. She displays normal reflexes.   Skin: Skin is warm and dry.   Psychiatric: She has a normal mood and affect. Her behavior is normal.     Office Visit on 02/13/2019   Component Date Value Ref Range Status   • Glucose 02/13/2019 188* 65 - 99 mg/dL Final   • BUN 02/13/2019 15  8 - 27 mg/dL Final   • Creatinine 02/13/2019 1.66* 0.57 - 1.00 mg/dL Final   • eGFR Non  Am 02/13/2019 32* >59 mL/min/1.73 Final   • eGFR African Am 02/13/2019 37* >59 mL/min/1.73 Final   • BUN/Creatinine Ratio 02/13/2019 9* 12 - 28 Final   • Sodium 02/13/2019 144  134 - 144 mmol/L Final   • Potassium 02/13/2019 4.6  3.5 - 5.2 mmol/L Final   • Chloride 02/13/2019 106  96 - 106 mmol/L Final   • Total CO2 02/13/2019 20  20 - 29 mmol/L Final   • Calcium 02/13/2019 10.4* 8.7 - 10.3 mg/dL Final   • Total Protein 02/13/2019 7.9  6.0 - 8.5 g/dL Final   • Albumin 02/13/2019 4.9* 3.6 - 4.8 g/dL Final   • Globulin 02/13/2019 3.0  1.5 - 4.5 g/dL Final   • A/G Ratio 02/13/2019 1.6  1.2 - 2.2 Final   • Total Bilirubin 02/13/2019 0.4  0.0 - 1.2 mg/dL Final   • Alkaline Phosphatase 02/13/2019 182* 39 - 117 IU/L Final   • AST (SGOT) 02/13/2019 16  0 - 40 IU/L Final   • ALT (SGPT) 02/13/2019 10  0 - 32 IU/L Final   • Total Cholesterol 02/13/2019 132  100 - 199 mg/dL Final   • Triglycerides 02/13/2019 107  0 - 149 mg/dL Final   • HDL Cholesterol 02/13/2019 48  >39 mg/dL Final   • VLDL Cholesterol 02/13/2019 21  5 - 40 mg/dL Final   • LDL Cholesterol  02/13/2019 63  0 - 99 mg/dL Final   • Hemoglobin A1C 02/13/2019 7.2* 4.8 - 5.6 % Final    Comment:          Prediabetes: 5.7 - 6.4           Diabetes: >6.4           Glycemic control for adults with diabetes: <7.0     • TSH 02/13/2019 1.270  0.450 - 4.500 uIU/mL Final   • Interpretation 02/13/2019 Note   Final    Supplemental report is available.   • PDF Image 02/13/2019 Not applicable   Final   • Interpretation 02/13/2019 Note   Final    Comment: -------------------------------  CHRONIC KIDNEY  DISEASE:  EGFR, BLOOD PRESSURE, AND PROTEINURIA ASSESSMENT  The overall regression of eGFR with time is not  statistically significant. Current eGFR is 32 mL/min/1.73mE2  corresponding to CKD stage 3b. Multiply eGFR by 1.159 if  patient is . Potassium is within goal and  has decreased, was 5.2 and now is 4.6 mmol/L. Glycemic  control (HB A1c: 7.2 %) is above goal but may be appropriate  if patient is at risk for hypoglycemia. Previous urine  protein measurement was normal.  EGFR, BLOOD PRESSURE, AND PROTEINURIA TREATMENT SUGGESTIONS  -  Based upon current eGFR, patient is at high risk for adverse  outcomes such as CKD progression, CVD, and mortality.  Guidelines recommend a target blood pressure of 140/90 mmHg  or less in CKD patients to reduce cardiovascular risk and  CKD progression.  EGFR, BLOOD PRESSURE, AND PROTEINURIA FOLLOW-UP  -  Hemoglobin A1C within 3 months; fasting Renal Panel within 1  month;  -  BONE and MINE                           RAL ASSESSMENT  Calcium is above goal and has risen, was 9.7 and now is 10.4  mg/dL. Carbon Dioxide is below goal and has decreased, was  24 and now is 20 mmol/L. Vitamin D deficiency is present  (13.4 ng/mL).  BONE and MINERAL TREATMENT SUGGESTIONS  -  Interpretations require simultaneous measurements of serum  calcium and phosphorus. If not on alkali, begin sodium  bicarbonate, one 650 mg pill 2-3 times daily, otherwise  increase dose. Begin vitamin D (ergocalciferol 50,000  IU/month orally for 6 months; alternatively, cholecalciferol  7125-7820 IU/d for 6 months). Stop calcium supplements if in  use.  BONE and MINERAL FOLLOW-UP  -  25-Hydroxy Vitamin D within 6 months; fasting Renal Panel  within 1 month; fasting PTH with Renal Panel is recommended  by KDOQI guidelines, at least yearly;  -  LIPIDS ASSESSMENT  Blood was non-fasting; interpret these results with caution.  LDL-C is optimal and has decreased, was 101 and now is 63  mg/dL. Triglyceride is  normal and has not changed  sign                           ificantly, was 108 and now is 107 mg/dL. Non-HDL  Cholesterol is optimal and has decreased, was 123 and now is  84 mg/dL. HDL-C is normal and has decreased, was 56 and now  is 48 mg/dL.  LIPIDS TREATMENT SUGGESTIONS  -  Therapeutic lifestyle changes are always valuable to  maintain optimal blood lipid status (diet, exercise, weight  management). Continue statin if in use. Consider measurement  of LDL particle number or Apo B to adjudicate need for  further LDL lowering therapy. If statin cannot be tolerated  or increased, alternatives include use of an intestinal  agent (ezetimibe or bile acid sequestrant) or niacin.  LIPIDS FOLLOW-UP  -  fasting Lipid Panel within 12 months;  -  ANEMIA ASSESSMENT  Most recent order does not include a CBC Panel or iron  studies.  ANEMIA FOLLOW-UP  -  CBC is recommended by KDOQI guidelines, at least yearly;  -------------------------------  DISCLAIMER  These assessments and treatment suggestions are provided as  a convenience in support of the physician                           -patient  relationship and are not intended to replace the physician's  clinical judgment. They are derived from national guidelines  in addition to other evidence and expert opinion. The  clinician should consider this information within the  context of clinical opinion and the individual patient.  SEE GUIDANCE FOR CHRONIC KIDNEY DISEASE PROGRAM: Kidney  Disease Improving Global Outcomes (KDIGO) clinical practice  guidelines are at http://kdigo.org/home/guidelines/.  National Kidney Foundation Kidney Disease Outcomes Quality  Initiative (KDOQI (TM)), with its limitations and  disclaimers, are at www.kidney.org/professionals/KDOQI. This  program is intended for patients who have been diagnosed  with stages 3, 4, or pre-dialysis 5 CKD. It is not intended  for children, pregnant patients, or transplant patients.     • PDF Image 02/13/2019 Not applicable    Final   • 25 Hydroxy, Vitamin D 02/13/2019 13.4* 30.0 - 100.0 ng/mL Final    Comment: Vitamin D deficiency has been defined by the [place] [employer]  [employer] and an Endocrine Society practice guideline as a  level of serum 25-OH vitamin D less than 20 ng/mL (1,2).  The Endocrine Society went on to further define vitamin D  insufficiency as a level between 21 and 29 ng/mL (2).  1. IOM ([place] [employer]). 2010. Dietary reference     intakes for calcium and D. [place]: The     National Academies Press.  2. Claire MF, Edward JUNE, Zeina HUSTON, et al.     Evaluation, treatment, and prevention of vitamin D     deficiency: an Endocrine Society clinical practice     guideline. JCEM. 2011 Jul; 96(7):1911-30.       Assessment/Plan   Jer was seen today for diabetes, hyperlipidemia, hypertension and vitamin d deficiency.    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 (CMS/[facility])  -     Comprehensive Metabolic Panel  -     Hemoglobin A1c  -     TSH    Hyperlipidemia, unspecified hyperlipidemia type  -     Comprehensive Metabolic Panel  -     Lipid Panel  -     TSH    Essential hypertension  -     Comprehensive Metabolic Panel    Vitamin D deficiency  -     Comprehensive Metabolic Panel  -     Cancel: Vitamin D 25 Hydroxy  -     Vitamin D 25 Hydroxy    Chronic bilateral low back pain with right-sided sciatica    Primary osteoarthritis involving multiple joints    Microalbuminuria      Continue Lantus and Humalog  Continue pravastatin 80 mg/day.  Continue gabapentin 600 mg twice daily.  Continue Avapro and torsemide.  Continue vitamin D 2000 units/day.    Copy of my note sent to Dr. Sheth, Dr. Ann and Dr. Martinez    RTC 4 mos

## 2019-06-26 ENCOUNTER — OFFICE VISIT (OUTPATIENT)
Dept: ENDOCRINOLOGY | Age: 69
End: 2019-06-26

## 2019-06-26 VITALS
WEIGHT: 231 LBS | SYSTOLIC BLOOD PRESSURE: 168 MMHG | HEIGHT: 62 IN | OXYGEN SATURATION: 98 % | BODY MASS INDEX: 42.51 KG/M2 | HEART RATE: 91 BPM | DIASTOLIC BLOOD PRESSURE: 92 MMHG

## 2019-06-26 DIAGNOSIS — M15.9 PRIMARY OSTEOARTHRITIS INVOLVING MULTIPLE JOINTS: ICD-10-CM

## 2019-06-26 DIAGNOSIS — R80.9 MICROALBUMINURIA: ICD-10-CM

## 2019-06-26 DIAGNOSIS — E13.9 LATENT AUTOIMMUNE DIABETES MELLITUS IN ADULTS (HCC): Primary | ICD-10-CM

## 2019-06-26 DIAGNOSIS — I10 ESSENTIAL HYPERTENSION: ICD-10-CM

## 2019-06-26 DIAGNOSIS — E78.5 HYPERLIPIDEMIA, UNSPECIFIED HYPERLIPIDEMIA TYPE: ICD-10-CM

## 2019-06-26 DIAGNOSIS — G89.29 CHRONIC BILATERAL LOW BACK PAIN WITH RIGHT-SIDED SCIATICA: ICD-10-CM

## 2019-06-26 DIAGNOSIS — M54.41 CHRONIC BILATERAL LOW BACK PAIN WITH RIGHT-SIDED SCIATICA: ICD-10-CM

## 2019-06-26 DIAGNOSIS — E55.9 VITAMIN D DEFICIENCY: ICD-10-CM

## 2019-06-26 PROCEDURE — 99214 OFFICE O/P EST MOD 30 MIN: CPT | Performed by: INTERNAL MEDICINE

## 2019-06-27 LAB
ALBUMIN SERPL-MCNC: 4.3 G/DL (ref 3.5–5.2)
ALBUMIN/GLOB SERPL: 1.4 G/DL
ALP SERPL-CCNC: 168 U/L (ref 39–117)
ALT SERPL-CCNC: 15 U/L (ref 1–33)
AST SERPL-CCNC: 19 U/L (ref 1–32)
BILIRUB SERPL-MCNC: 0.4 MG/DL (ref 0.2–1.2)
BUN SERPL-MCNC: 18 MG/DL (ref 8–23)
BUN/CREAT SERPL: 10.7 (ref 7–25)
CALCIUM SERPL-MCNC: 10 MG/DL (ref 8.6–10.5)
CHLORIDE SERPL-SCNC: 107 MMOL/L (ref 98–107)
CHOLEST SERPL-MCNC: 171 MG/DL (ref 0–200)
CO2 SERPL-SCNC: 28.9 MMOL/L (ref 22–29)
CREAT SERPL-MCNC: 1.68 MG/DL (ref 0.57–1)
GLOBULIN SER CALC-MCNC: 3.1 GM/DL
GLUCOSE SERPL-MCNC: 62 MG/DL (ref 65–99)
HBA1C MFR BLD: 7.1 % (ref 4.8–5.6)
HDLC SERPL-MCNC: 65 MG/DL (ref 40–60)
INTERPRETATION: NORMAL
LDLC SERPL CALC-MCNC: 83 MG/DL (ref 0–100)
Lab: NORMAL
POTASSIUM SERPL-SCNC: 5 MMOL/L (ref 3.5–5.2)
PROT SERPL-MCNC: 7.4 G/DL (ref 6–8.5)
SODIUM SERPL-SCNC: 147 MMOL/L (ref 136–145)
TRIGL SERPL-MCNC: 113 MG/DL (ref 0–150)
TSH SERPL DL<=0.005 MIU/L-ACNC: 1.79 MIU/ML (ref 0.27–4.2)
VLDLC SERPL CALC-MCNC: 22.6 MG/DL

## 2019-08-12 RX ORDER — PEN NEEDLE, DIABETIC 29 G X1/2"
NEEDLE, DISPOSABLE MISCELLANEOUS
Refills: 1 | OUTPATIENT
Start: 2019-08-12

## 2019-08-12 RX ORDER — GABAPENTIN 600 MG/1
600 TABLET ORAL 2 TIMES DAILY
Qty: 180 TABLET | Refills: 1 | Status: SHIPPED | OUTPATIENT
Start: 2019-08-12 | End: 2020-03-23 | Stop reason: SDUPTHER

## 2019-08-12 NOTE — TELEPHONE ENCOUNTER
----- Message from Va Glynn sent at 8/12/2019 10:43 AM EDT -----  Contact: patient  Patient requested new script for Gabapentin, 600mg, 1 tab, 2 xday,    BD ultra fine short pen needles, [id number], 2xday, 90 day supply      [facility]     She  said she is having  hip replacement surgery on 10-11-19 by orthopedist  Dr. Reyes and Dr. Reyes wants her to come back to see Dr. Dorman so he can monitor her blood sugar prior to surgery. He wants her to lose 15 lbs before surgery.   She is asking for an appointment close to time of surgery with Dr. Dorman At this time Dr. Dorman does not have an appointment that I can schedule prior to 10-11-19.       Pt - [phone]

## 2019-08-12 NOTE — TELEPHONE ENCOUNTER
Last ov appt 6/26/19  Next ov appt 11/5/19    tonie ran 8/12/19  Last filled 5/15/19 for#180 for 90 day supply

## 2019-08-15 ENCOUNTER — TELEPHONE (OUTPATIENT)
Dept: ENDOCRINOLOGY | Age: 69
End: 2019-08-15

## 2019-08-15 NOTE — TELEPHONE ENCOUNTER
----- Message from Va Glynn sent at 8/15/2019 11:52 AM EDT -----  Contact: patient  I called patient and told her that you had approved 10-11-19 but she is scheduled to have surgery that day. She said if Dr. Dorman cannot see her sooner than 10-11-19 that  is asking for clearance on body mass index and A1C.    Pt - [phone]  ----- Message -----  From: Natalee Mann MA  Sent: 8/12/2019   1:39 PM  To: Va Glnyn    You can put her on 10/1119 @3:30 tell her to be here @3  ----- Message -----  From: Va Glynn  Sent: 8/12/2019  10:43 AM  To: Natalee Mann MA    Patient requested new script for Gabapentin, 600mg, 1 tab, 2 xday,    BD ultra fine short pen needles, [id number], 2xday, 90 day supply      [facility]     She  said she is having  hip replacement surgery on 10-11-19 by orthopedist  Dr. Reyes and Dr. Reyes wants her to come back to see Dr. Dorman so he can monitor her blood sugar prior to surgery. He wants her to lose 15 lbs before surgery.   She is asking for an appointment close to time of surgery with Dr. Dorman At this time Dr. Dorman does not have an appointment that I can schedule prior to 10-11-19.       Pt - [phone]

## 2019-09-01 DIAGNOSIS — I10 ESSENTIAL HYPERTENSION: ICD-10-CM

## 2019-09-01 DIAGNOSIS — M47.896 OTHER OSTEOARTHRITIS OF SPINE, LUMBAR REGION: ICD-10-CM

## 2019-09-01 DIAGNOSIS — M17.0 OSTEOARTHRITIS OF BOTH KNEES, UNSPECIFIED OSTEOARTHRITIS TYPE: ICD-10-CM

## 2019-09-01 DIAGNOSIS — K21.9 GASTROESOPHAGEAL REFLUX DISEASE WITHOUT ESOPHAGITIS: ICD-10-CM

## 2019-09-01 DIAGNOSIS — E13.9 LATENT AUTOIMMUNE DIABETES MELLITUS IN ADULTS (HCC): ICD-10-CM

## 2019-09-01 DIAGNOSIS — E55.9 VITAMIN D DEFICIENCY: ICD-10-CM

## 2019-09-01 DIAGNOSIS — E78.5 HYPERLIPIDEMIA, UNSPECIFIED HYPERLIPIDEMIA TYPE: ICD-10-CM

## 2019-09-01 DIAGNOSIS — K31.84 GASTROPARESIS: ICD-10-CM

## 2019-09-01 DIAGNOSIS — R80.9 MICROALBUMINURIA: ICD-10-CM

## 2019-09-03 RX ORDER — MONTELUKAST SODIUM 10 MG/1
TABLET ORAL
Qty: 90 TABLET | Refills: 1 | Status: SHIPPED | OUTPATIENT
Start: 2019-09-03 | End: 2020-04-20

## 2019-10-11 ENCOUNTER — TELEPHONE (OUTPATIENT)
Dept: ENDOCRINOLOGY | Age: 69
End: 2019-10-11

## 2019-10-11 NOTE — TELEPHONE ENCOUNTER
Deborah, physical therapist with [facility] ph. [phone] asked for an order for  bedside commode to be  sent to [facility] fax [phone]

## 2019-10-11 NOTE — TELEPHONE ENCOUNTER
----- Message from Kiana العراقي sent at 10/8/2019 11:06 AM EDT -----  Regarding: LABS  ORDERS [id number]

## 2019-10-14 ENCOUNTER — TELEPHONE (OUTPATIENT)
Dept: ENDOCRINOLOGY | Age: 69
End: 2019-10-14

## 2019-10-14 NOTE — TELEPHONE ENCOUNTER
[facility]   [phone]  Ext [phone]    Fax number [phone]      Free style chester  Progress notes is missing how many times testing a day    Needs to be testing 4 x a day to be approved

## 2019-10-16 ENCOUNTER — TELEPHONE (OUTPATIENT)
Dept: ENDOCRINOLOGY | Age: 69
End: 2019-10-16

## 2019-10-16 NOTE — TELEPHONE ENCOUNTER
Seble with [email or web address], ph. [phone], ext. [phone], fax [phone] said they received office note yesterday but it was the same one and there was not an addendum stating that the patient test 4xdaily.   She said in order for Medicare to cover Freestyle Ariella they require testing 4xday and the note shows patient test 2xday.    She said patient is testing 2xday and injecting 3xday and needs to be testing 4xdaily.   She said they like to work closely with the diabetic educator to ensure patients success during the transition to new technology  and to share their contact information and she asked if patient sees a diabetic educator.

## 2019-10-18 ENCOUNTER — TELEPHONE (OUTPATIENT)
Dept: ENDOCRINOLOGY | Age: 69
End: 2019-10-18

## 2019-10-18 NOTE — TELEPHONE ENCOUNTER
REQUEST WAS SENT OVER  ON 10/16    IS PATIENT TESTING 4 X DAILY    IF NOT TESTING 4 X DAILY PATIENT DOESN'T QUALIFY     FOR FREE STYLE DARYN GLUCOSE MONITOR     PLEASE SEND DOCUMENTATION FAX NUMBER

## 2019-10-21 NOTE — TELEPHONE ENCOUNTER
10/21 called and lm with [employer] to let him know pt is only testing bs 3 x day

## 2019-11-04 RX ORDER — MELOXICAM 15 MG/1
TABLET ORAL
COMMUNITY
Start: 2019-09-29 | End: 2019-11-05 | Stop reason: ALTCHOICE

## 2019-11-04 NOTE — PROGRESS NOTES
Subjective   Eddie D Parmer is a 69 y.o. female.     F/u for ANNI, hyperlipidemia, hypertension, depression, vitamin d def/ testing bs 2 x day / [facility] 2019 [facility]/ last dm foot exam 2/13/19 with dr Dorman / flu vaccine @[facility]        Patient is a 69-year-old female came in for follow-up. She has known diabetes mellitus and has elevated MATT antibody. She was started on insulin in 2013 and is on Lantus 70 units every evening and Humalog 32 units at breakfast, 30 units at lunch, and 32 units at supper.  Fasting glucose .  Random glucose 109-262..  She denies severe hypoglycemic episodes.  She has lost 3 pounds since June 2019.. Her last meal was last night.      Her last eye examination was in 7/19 and has bleeding on left eye and last left eye injection in 9/19 . She denies numbness in her hands.  She is on gabapentin 600 mg BID.  She has no microalbuminuria on urine sample taken last 10/18.      She has degenerative spondylolisthesis disease with spinal stenosis and degenerative scoliosis.  She has been seeing Dr. Sheth and Dr. Delgado for possible spine surgery.     She has bilateral degenerative arthritis of knees and hips and was advised knee replacement by Dr. Gambino in the past.  She is seeing Dr. Martinez for pain management and is on oxycodone and meloxicam.  Her last epidural and right hip injection was in 1/19.  Right hip replacement to be done by Dr. Reyes is on hold.    She has hypertension and has been on Avapro 300 mg once a day, amlodipine 10 mg/day and carvedilol 6.25 mg twice daily.  She takes torsemide 40 mg 1-2 tab/day as needed for edema.   She denies any chest pain, or orthopnea.       She has hyperlipidemia and has been on pravastatin 80 mg once a day. She denies any myalgia.       She has acid reflux disease which is controlled by Protonix. She has been off Reglan. She was seen by Dr. Britton in the past.     She has vitamin D deficiency and is on vitamin D3 2000  "units/day.    She was admitted in September 2019 after she fell and sustained an L1 compression fracture.  She was seen by neurosurgeon and was not advised surgical intervention.  She is undergoing physical therapy at home.    The following portions of the patient's history were reviewed and updated as appropriate: allergies, current medications, past family history, past medical history, past social history, past surgical history and problem list.    Review of Systems   Constitutional: Negative.  Negative for fatigue and fever.   HENT: Negative.    Eyes: Negative.    Respiratory: Negative.    Cardiovascular: Positive for leg swelling. Negative for chest pain and palpitations.   Gastrointestinal: Negative.    Endocrine: Negative.    Genitourinary: Negative.    Musculoskeletal: Positive for back pain. Negative for myalgias.   Neurological: Negative for dizziness, seizures and weakness.     ROS reviewed again  Objective      Vitals:    11/05/19 1506 11/05/19 1613   BP: 162/70 140/80   BP Location: Right arm    Patient Position: Sitting    Cuff Size: Large Adult    Pulse: 84    SpO2: 100%    Weight: 104 kg (228 lb 6.4 oz)    Height: 157.5 cm (62.01\")      Physical Exam   Constitutional: She is oriented to person, place, and time. She appears well-developed and well-nourished. No distress.   HENT:   Head: Normocephalic.   Nose: Nose normal.   Mouth/Throat: No oropharyngeal exudate.   Eyes: Conjunctivae and EOM are normal. Right eye exhibits no discharge. Left eye exhibits no discharge. No scleral icterus.   Neck: Neck supple. No JVD present. No tracheal deviation present. No thyromegaly present.   Cardiovascular: Normal rate, regular rhythm, normal heart sounds and intact distal pulses. Exam reveals no gallop and no friction rub.   No murmur heard.  Pulmonary/Chest: Effort normal and breath sounds normal. No stridor. No respiratory distress. She has no wheezes. She has no rales. She exhibits no tenderness.   Abdominal: " Soft. Bowel sounds are normal. She exhibits no distension and no mass. There is no tenderness. There is no rebound and no guarding.   Genitourinary: Rectal exam shows guaiac negative stool. No vaginal discharge found.   Musculoskeletal: Normal range of motion. She exhibits no edema, tenderness or deformity.   Lymphadenopathy:     She has no cervical adenopathy.   Neurological: She is alert and oriented to person, place, and time. She displays normal reflexes. No cranial nerve deficit or sensory deficit. She exhibits normal muscle tone. Coordination normal.   Skin: Skin is warm and dry. No rash noted. No erythema. No pallor.   Psychiatric: She has a normal mood and affect. Her behavior is normal.     Office Visit on 06/26/2019   Component Date Value Ref Range Status   • Glucose 06/26/2019 62* 65 - 99 mg/dL Final   • BUN 06/26/2019 18  8 - 23 mg/dL Final   • Creatinine 06/26/2019 1.68* 0.57 - 1.00 mg/dL Final   • eGFR Non African Am 06/26/2019 30* >60 mL/min/1.73 Final   • eGFR African Am 06/26/2019 37* >60 mL/min/1.73 Final   • BUN/Creatinine Ratio 06/26/2019 10.7  7.0 - 25.0 Final   • Sodium 06/26/2019 147* 136 - 145 mmol/L Final   • Potassium 06/26/2019 5.0  3.5 - 5.2 mmol/L Final   • Chloride 06/26/2019 107  98 - 107 mmol/L Final   • Total CO2 06/26/2019 28.9  22.0 - 29.0 mmol/L Final   • Calcium 06/26/2019 10.0  8.6 - 10.5 mg/dL Final   • Total Protein 06/26/2019 7.4  6.0 - 8.5 g/dL Final   • Albumin 06/26/2019 4.30  3.50 - 5.20 g/dL Final   • Globulin 06/26/2019 3.1  gm/dL Final   • A/G Ratio 06/26/2019 1.4  g/dL Final   • Total Bilirubin 06/26/2019 0.4  0.2 - 1.2 mg/dL Final   • Alkaline Phosphatase 06/26/2019 168* 39 - 117 U/L Final   • AST (SGOT) 06/26/2019 19  1 - 32 U/L Final   • ALT (SGPT) 06/26/2019 15  1 - 33 U/L Final   • Total Cholesterol 06/26/2019 171  0 - 200 mg/dL Final   • Triglycerides 06/26/2019 113  0 - 150 mg/dL Final   • HDL Cholesterol 06/26/2019 65* 40 - 60 mg/dL Final   • VLDL Cholesterol  06/26/2019 22.6  mg/dL Final   • LDL Cholesterol  06/26/2019 83  0 - 100 mg/dL Final   • Hemoglobin A1C 06/26/2019 7.10* 4.80 - 5.60 % Final    Comment: Hemoglobin A1C Ranges:  Increased Risk for Diabetes  5.7% to 6.4%  Diabetes                     >= 6.5%  Diabetic Goal                < 7.0%     • TSH 06/26/2019 1.790  0.270 - 4.200 mIU/mL Final   • Interpretation 06/26/2019 Note   Final    Supplemental report is available.   • PDF Image 06/26/2019 Not applicable   Final     Assessment/Plan   Jer was seen today for diabetes, hyperlipidemia, hypertension, vitamin d deficiency and depression.    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 (CMS/[facility])  -     Comprehensive Metabolic Panel  -     Lipid Panel  -     Hemoglobin A1c  -     TSH  -     Microalbumin / Creatinine Urine Ratio - Urine, Clean Catch    Hyperlipidemia, unspecified hyperlipidemia type  -     Comprehensive Metabolic Panel  -     Lipid Panel  -     TSH    Essential hypertension  -     Comprehensive Metabolic Panel    Gastroesophageal reflux disease, esophagitis presence not specified    Vitamin D deficiency  -     Comprehensive Metabolic Panel  -     Vitamin D 25 Hydroxy    Microalbuminuria  -     Microalbumin / Creatinine Urine Ratio - Urine, Clean Catch      Continue Lantus and Humalog.  Continue pravastatin 80 mg/day and low-fat diet.  Continue irbesartan 300 mg/day, amlodipine 10 mg/day, and Coreg 6.25 mg twice a day.  Continue Protonix  Continue vitamin D3 2000 units/day.    Copy of my note sent to Dr. Britton, Dr. Sheth, Dr. Delgado, and Dr. Reyes    C 4 mos.

## 2019-11-05 ENCOUNTER — OFFICE VISIT (OUTPATIENT)
Dept: ENDOCRINOLOGY | Age: 69
End: 2019-11-05

## 2019-11-05 VITALS
HEIGHT: 62 IN | DIASTOLIC BLOOD PRESSURE: 80 MMHG | WEIGHT: 228.4 LBS | OXYGEN SATURATION: 100 % | BODY MASS INDEX: 42.03 KG/M2 | HEART RATE: 84 BPM | SYSTOLIC BLOOD PRESSURE: 140 MMHG

## 2019-11-05 DIAGNOSIS — E78.5 HYPERLIPIDEMIA, UNSPECIFIED HYPERLIPIDEMIA TYPE: ICD-10-CM

## 2019-11-05 DIAGNOSIS — E13.9 LATENT AUTOIMMUNE DIABETES MELLITUS IN ADULTS (HCC): Primary | ICD-10-CM

## 2019-11-05 DIAGNOSIS — K21.9 GASTROESOPHAGEAL REFLUX DISEASE, ESOPHAGITIS PRESENCE NOT SPECIFIED: ICD-10-CM

## 2019-11-05 DIAGNOSIS — I10 ESSENTIAL HYPERTENSION: ICD-10-CM

## 2019-11-05 DIAGNOSIS — R80.9 MICROALBUMINURIA: ICD-10-CM

## 2019-11-05 DIAGNOSIS — E55.9 VITAMIN D DEFICIENCY: ICD-10-CM

## 2019-11-05 PROCEDURE — 99214 OFFICE O/P EST MOD 30 MIN: CPT | Performed by: INTERNAL MEDICINE

## 2019-11-05 RX ORDER — CARVEDILOL 6.25 MG/1
6.25 TABLET ORAL 2 TIMES DAILY
Refills: 5 | COMMUNITY
Start: 2019-10-06 | End: 2020-04-28 | Stop reason: SDUPTHER

## 2019-11-05 RX ORDER — AMLODIPINE BESYLATE 10 MG/1
10 TABLET ORAL DAILY
Refills: 5 | COMMUNITY
Start: 2019-10-06 | End: 2020-04-28 | Stop reason: SDUPTHER

## 2019-11-06 LAB
25(OH)D3+25(OH)D2 SERPL-MCNC: 33.3 NG/ML (ref 30–100)
ALBUMIN SERPL-MCNC: 4.7 G/DL (ref 3.5–5.2)
ALBUMIN/CREAT UR: 4.4 MG/G CREAT (ref 0–30)
ALBUMIN/GLOB SERPL: 1.8 G/DL
ALP SERPL-CCNC: 162 U/L (ref 39–117)
ALT SERPL-CCNC: 11 U/L (ref 1–33)
AST SERPL-CCNC: 17 U/L (ref 1–32)
BILIRUB SERPL-MCNC: 0.2 MG/DL (ref 0.2–1.2)
BUN SERPL-MCNC: 27 MG/DL (ref 8–23)
BUN/CREAT SERPL: 15 (ref 7–25)
CALCIUM SERPL-MCNC: 10.3 MG/DL (ref 8.6–10.5)
CHLORIDE SERPL-SCNC: 110 MMOL/L (ref 98–107)
CHOLEST SERPL-MCNC: 146 MG/DL (ref 0–200)
CO2 SERPL-SCNC: 24 MMOL/L (ref 22–29)
CREAT SERPL-MCNC: 1.8 MG/DL (ref 0.57–1)
CREAT UR-MCNC: 128.3 MG/DL
GLOBULIN SER CALC-MCNC: 2.6 GM/DL
GLUCOSE SERPL-MCNC: 40 MG/DL (ref 65–99)
HBA1C MFR BLD: 6.8 % (ref 4.8–5.6)
HDLC SERPL-MCNC: 56 MG/DL (ref 40–60)
INTERPRETATION: NORMAL
LDLC SERPL CALC-MCNC: 66 MG/DL (ref 0–100)
Lab: NORMAL
MICROALBUMIN UR-MCNC: 5.6 UG/ML
POTASSIUM SERPL-SCNC: 6.1 MMOL/L (ref 3.5–5.2)
PROT SERPL-MCNC: 7.3 G/DL (ref 6–8.5)
SODIUM SERPL-SCNC: 145 MMOL/L (ref 136–145)
TRIGL SERPL-MCNC: 120 MG/DL (ref 0–150)
TSH SERPL DL<=0.005 MIU/L-ACNC: 1.98 UIU/ML (ref 0.27–4.2)
VLDLC SERPL CALC-MCNC: 24 MG/DL

## 2019-11-07 ENCOUNTER — TELEPHONE (OUTPATIENT)
Dept: ENDOCRINOLOGY | Age: 69
End: 2019-11-07

## 2019-11-07 DIAGNOSIS — I10 ESSENTIAL HYPERTENSION: ICD-10-CM

## 2019-11-07 DIAGNOSIS — E87.5 HIGH POTASSIUM: Primary | ICD-10-CM

## 2019-11-07 DIAGNOSIS — M17.0 OSTEOARTHRITIS OF BOTH KNEES, UNSPECIFIED OSTEOARTHRITIS TYPE: ICD-10-CM

## 2019-11-07 DIAGNOSIS — K31.84 GASTROPARESIS: ICD-10-CM

## 2019-11-07 DIAGNOSIS — M47.896 OTHER OSTEOARTHRITIS OF SPINE, LUMBAR REGION: ICD-10-CM

## 2019-11-07 DIAGNOSIS — E78.49 OTHER HYPERLIPIDEMIA: ICD-10-CM

## 2019-11-07 DIAGNOSIS — E13.9 LATENT AUTOIMMUNE DIABETES MELLITUS IN ADULTS (HCC): ICD-10-CM

## 2019-11-07 DIAGNOSIS — R80.9 MICROALBUMINURIA: ICD-10-CM

## 2019-11-07 DIAGNOSIS — E55.9 VITAMIN D DEFICIENCY: ICD-10-CM

## 2019-11-07 DIAGNOSIS — K21.9 GASTROESOPHAGEAL REFLUX DISEASE WITHOUT ESOPHAGITIS: ICD-10-CM

## 2019-11-07 RX ORDER — PRAVASTATIN SODIUM 80 MG/1
TABLET ORAL
Qty: 90 TABLET | Refills: 1 | Status: SHIPPED | OUTPATIENT
Start: 2019-11-07 | End: 2020-05-28

## 2019-11-07 RX ORDER — IRBESARTAN 300 MG/1
TABLET ORAL
Qty: 90 TABLET | Refills: 1 | Status: SHIPPED | OUTPATIENT
Start: 2019-11-07 | End: 2019-11-08 | Stop reason: SINTOL

## 2019-11-07 NOTE — TELEPHONE ENCOUNTER
Pt called said she was returning your call from yesterday message concerning her lab results. Please call back at [phone] pt cell

## 2019-11-07 NOTE — TELEPHONE ENCOUNTER
11/7  Called and sw pt told her she needs to go to [facility] to get repeat  lab work done  due to elevated potassium / she states she doesn't have a ride today will go tomorrow if she can get someone to take her/ told her it is a stat order she needs to stay there until they call us with the results

## 2019-11-08 ENCOUNTER — LAB (OUTPATIENT)
Dept: LAB | Facility: HOSPITAL | Age: 69
End: 2019-11-08

## 2019-11-08 ENCOUNTER — TELEPHONE (OUTPATIENT)
Dept: ENDOCRINOLOGY | Age: 69
End: 2019-11-08

## 2019-11-08 DIAGNOSIS — E87.5 SERUM POTASSIUM ELEVATED: Primary | ICD-10-CM

## 2019-11-08 DIAGNOSIS — E87.5 HIGH POTASSIUM: ICD-10-CM

## 2019-11-08 LAB
ANION GAP SERPL CALCULATED.3IONS-SCNC: 11.2 MMOL/L (ref 5–15)
BUN BLD-MCNC: 22 MG/DL (ref 8–23)
BUN/CREAT SERPL: 12.9 (ref 7–25)
CALCIUM SPEC-SCNC: 9.3 MG/DL (ref 8.6–10.5)
CHLORIDE SERPL-SCNC: 109 MMOL/L (ref 98–107)
CO2 SERPL-SCNC: 21.8 MMOL/L (ref 22–29)
CREAT BLD-MCNC: 1.7 MG/DL (ref 0.57–1)
GFR SERPL CREATININE-BSD FRML MDRD: 30 ML/MIN/1.73
GLUCOSE BLD-MCNC: 151 MG/DL (ref 65–99)
POTASSIUM BLD-SCNC: 5.9 MMOL/L (ref 3.5–5.2)
SODIUM BLD-SCNC: 142 MMOL/L (ref 136–145)

## 2019-11-08 PROCEDURE — 80048 BASIC METABOLIC PNL TOTAL CA: CPT

## 2019-11-08 RX ORDER — HYDRALAZINE HYDROCHLORIDE 25 MG/1
25 TABLET, FILM COATED ORAL 3 TIMES DAILY
Qty: 90 TABLET | Refills: 5 | Status: SHIPPED | OUTPATIENT
Start: 2019-11-08 | End: 2020-06-18

## 2019-11-08 NOTE — TELEPHONE ENCOUNTER
Patient said you can send script to [facility] - [address] - [phone]  Pt - [phone] cell

## 2019-11-13 ENCOUNTER — RESULTS ENCOUNTER (OUTPATIENT)
Dept: ENDOCRINOLOGY | Age: 69
End: 2019-11-13

## 2019-11-13 DIAGNOSIS — E87.5 SERUM POTASSIUM ELEVATED: ICD-10-CM

## 2019-11-14 ENCOUNTER — TELEPHONE (OUTPATIENT)
Dept: ENDOCRINOLOGY | Age: 69
End: 2019-11-14

## 2019-11-14 ENCOUNTER — HOSPITAL ENCOUNTER (EMERGENCY)
Facility: HOSPITAL | Age: 69
Discharge: HOME | End: 2019-11-14
Attending: EMERGENCY MEDICINE | Admitting: EMERGENCY MEDICINE

## 2019-11-14 VITALS
SYSTOLIC BLOOD PRESSURE: 160 MMHG | HEIGHT: 62 IN | TEMPERATURE: 98.3 F | RESPIRATION RATE: 12 BRPM | WEIGHT: 232.1 LBS | DIASTOLIC BLOOD PRESSURE: 85 MMHG | HEART RATE: 79 BPM | BODY MASS INDEX: 42.71 KG/M2 | OXYGEN SATURATION: 100 %

## 2019-11-14 DIAGNOSIS — N18.9 CHRONIC KIDNEY DISEASE, UNSPECIFIED CKD STAGE: Primary | ICD-10-CM

## 2019-11-14 LAB
ALBUMIN SERPL-MCNC: 4.4 G/DL (ref 3.5–5.2)
ALBUMIN/GLOB SERPL: 1.4 G/DL
ALP SERPL-CCNC: 136 U/L (ref 39–117)
ALT SERPL W P-5'-P-CCNC: 14 U/L (ref 1–33)
ANION GAP SERPL CALCULATED.3IONS-SCNC: 11.4 MMOL/L (ref 5–15)
AST SERPL-CCNC: 19 U/L (ref 1–32)
BASOPHILS # BLD AUTO: 0.04 10*3/MM3 (ref 0–0.2)
BASOPHILS NFR BLD AUTO: 0.4 % (ref 0–1.5)
BILIRUB SERPL-MCNC: 0.2 MG/DL (ref 0.2–1.2)
BUN BLD-MCNC: 28 MG/DL (ref 8–23)
BUN SERPL-MCNC: 29 MG/DL (ref 8–23)
BUN/CREAT SERPL: 14.2 (ref 7–25)
BUN/CREAT SERPL: 14.4 (ref 7–25)
CALCIUM SERPL-MCNC: 10.1 MG/DL (ref 8.6–10.5)
CALCIUM SPEC-SCNC: 9.5 MG/DL (ref 8.6–10.5)
CHLORIDE SERPL-SCNC: 106 MMOL/L (ref 98–107)
CHLORIDE SERPL-SCNC: 106 MMOL/L (ref 98–107)
CO2 SERPL-SCNC: 21.6 MMOL/L (ref 22–29)
CO2 SERPL-SCNC: 22.6 MMOL/L (ref 22–29)
CREAT BLD-MCNC: 1.97 MG/DL (ref 0.57–1)
CREAT SERPL-MCNC: 2.02 MG/DL (ref 0.57–1)
DEPRECATED RDW RBC AUTO: 45.9 FL (ref 37–54)
EOSINOPHIL # BLD AUTO: 0.17 10*3/MM3 (ref 0–0.4)
EOSINOPHIL NFR BLD AUTO: 1.7 % (ref 0.3–6.2)
ERYTHROCYTE [DISTWIDTH] IN BLOOD BY AUTOMATED COUNT: 13 % (ref 12.3–15.4)
GFR SERPL CREATININE-BSD FRML MDRD: 25 ML/MIN/1.73
GLOBULIN UR ELPH-MCNC: 3.1 GM/DL
GLUCOSE BLD-MCNC: 263 MG/DL (ref 65–99)
GLUCOSE SERPL-MCNC: 62 MG/DL (ref 65–99)
HCT VFR BLD AUTO: 34.2 % (ref 34–46.6)
HGB BLD-MCNC: 11.1 G/DL (ref 12–15.9)
IMM GRANULOCYTES # BLD AUTO: 0.13 10*3/MM3 (ref 0–0.05)
IMM GRANULOCYTES NFR BLD AUTO: 1.3 % (ref 0–0.5)
LYMPHOCYTES # BLD AUTO: 1.3 10*3/MM3 (ref 0.7–3.1)
LYMPHOCYTES NFR BLD AUTO: 13.1 % (ref 19.6–45.3)
MCH RBC QN AUTO: 31.8 PG (ref 26.6–33)
MCHC RBC AUTO-ENTMCNC: 32.5 G/DL (ref 31.5–35.7)
MCV RBC AUTO: 98 FL (ref 79–97)
MONOCYTES # BLD AUTO: 0.84 10*3/MM3 (ref 0.1–0.9)
MONOCYTES NFR BLD AUTO: 8.5 % (ref 5–12)
NEUTROPHILS # BLD AUTO: 7.43 10*3/MM3 (ref 1.7–7)
NEUTROPHILS NFR BLD AUTO: 75 % (ref 42.7–76)
NRBC BLD AUTO-RTO: 0 /100 WBC (ref 0–0.2)
PLATELET # BLD AUTO: 341 10*3/MM3 (ref 140–450)
PMV BLD AUTO: 9.6 FL (ref 6–12)
POTASSIUM BLD-SCNC: 5.2 MMOL/L (ref 3.5–5.2)
POTASSIUM SERPL-SCNC: 6.1 MMOL/L (ref 3.5–5.2)
PROT SERPL-MCNC: 7.5 G/DL (ref 6–8.5)
RBC # BLD AUTO: 3.49 10*6/MM3 (ref 3.77–5.28)
SODIUM BLD-SCNC: 139 MMOL/L (ref 136–145)
SODIUM SERPL-SCNC: 143 MMOL/L (ref 136–145)
WBC NRBC COR # BLD: 9.91 10*3/MM3 (ref 3.4–10.8)

## 2019-11-14 PROCEDURE — 99284 EMERGENCY DEPT VISIT MOD MDM: CPT

## 2019-11-14 PROCEDURE — 93010 ELECTROCARDIOGRAM REPORT: CPT | Performed by: INTERNAL MEDICINE

## 2019-11-14 PROCEDURE — 80053 COMPREHEN METABOLIC PANEL: CPT | Performed by: PHYSICIAN ASSISTANT

## 2019-11-14 PROCEDURE — 85025 COMPLETE CBC W/AUTO DIFF WBC: CPT | Performed by: PHYSICIAN ASSISTANT

## 2019-11-14 PROCEDURE — 93005 ELECTROCARDIOGRAM TRACING: CPT | Performed by: PHYSICIAN ASSISTANT

## 2019-11-14 NOTE — ED PROVIDER NOTES
EMERGENCY DEPARTMENT ENCOUNTER    Room Number:  24/24  Date seen:  11/14/2019  Time seen: 11:16 AM  PCP: Yovani Dorman MD    HPI:  Chief complaint: Abnormal lab  Context:Eddie D Parmer is a 69 y.o. female who presents to the ED with c/o elevated potassium. Pt had labs drawn yesterday at her PCP's office and was informed this morning that her potassium was elevated at 6.1. She was advised to present to the ED. Pt has no complaints. Pt denies fever, CP, palpitations, SOA, abd pain, N/V/D, and urinary sx. She does not take a potassium supplement. Pt has a h/o DM, HTN, HLD, and breast cancer.     Onset: gradual  Location: potassium   Duration: unknown  Timing: constant   Character:potassium was 6.1  Severity: moderate       MEDICAL RECORD REVIEW  Basic Metabolic Panel from 11/13/19    Component  Ref Range & Units 1d ago 6d ago   Glucose  65 - 99 mg/dL 62 Abnormally low   151 Abnormally high     BUN  8 - 23 mg/dL 29 Abnormally high   22    Creatinine  0.57 - 1.00 mg/dL 2.02 Abnormally high   1.70 Abnormally high     eGFR Non African Am  >60 mL/min/1.73 25 Abnormally low   30 Abnormally low     eGFR African Am  >60 mL/min/1.73 30 Abnormally low      BUN/Creatinine Ratio  7.0 - 25.0 14.4  12.9    Sodium  136 - 145 mmol/L 143  142    Potassium  3.5 - 5.2 mmol/L 6.1 Critically high   5.9 Abnormally high     Chloride  98 - 107 mmol/L 106  109 Abnormally high     Total CO2  22.0 - 29.0 mmol/L 22.6     Calcium  8.6 - 10.5 mg/dL 10.1  9.3                 ALLERGIES  Codeine; Lisinopril; and Simvastatin    PAST MEDICAL HISTORY  Active Ambulatory Problems     Diagnosis Date Noted   • Atopic rhinitis 01/27/2016   • Cervical radiculopathy 01/27/2016   • Chronic allergic conjunctivitis 01/27/2016   • Osteoarthritis 01/27/2016   • Gastroparesis 01/27/2016   • Gastroesophageal reflux disease 01/27/2016   • Hyperlipidemia 01/27/2016   • Essential hypertension 01/27/2016   • 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)  01/27/2016   • Osteoarthritis of lumbar spine 01/27/2016   • Microalbuminuria 01/27/2016   • Vitamin D deficiency 01/27/2016   • Allergic rhinitis due to pollen 10/17/2016   • Lumbar spinal stenosis 10/13/2017   • Chronic bilateral low back pain with right-sided sciatica 04/05/2018     Resolved Ambulatory Problems     Diagnosis Date Noted   • No Resolved Ambulatory Problems     Past Medical History:   Diagnosis Date   • Breast cancer (CMS/[facility])    • GERD (gastroesophageal reflux disease)    • Hyperlipidemia    • ANNI (latent autoimmune diabetes mellitus in adults) (CMS/[facility])        PAST SURGICAL HISTORY  Past Surgical History:   Procedure Laterality Date   • BREAST SURGERY Left     RECONSTRUCTION   • BREAST SURGERY Left     MASTECTOMY   • COLONOSCOPY      2015.  NORMAL   • ROTATOR CUFF REPAIR Right     X3   • TUBAL ABDOMINAL LIGATION         FAMILY HISTORY  Family History   Problem Relation Age of Onset   • Diabetes Mother    • Hypertension Mother    • Heart disease Mother    • Heart disease Sister    • Hypertension Sister    • Hypertension Daughter        SOCIAL HISTORY  Social History     Socioeconomic History   • Marital status: Unknown     Spouse name: Not on file   • Number of children: 2   • Years of education: 9   • Highest education level: Not on file   Occupational History   • Occupation: RETIRED   Tobacco Use   • Smoking status: Never Smoker   • Smokeless tobacco: Never Used   Substance and Sexual Activity   • Alcohol use: Yes     Comment: SOCIALLY   • Drug use: No   • Sexual activity: Defer   Social History Narrative    LIVES ALONE       REVIEW OF SYSTEMS  Review of Systems   Constitutional: Negative for chills and fever.        Abnormal potassium +   HENT: Negative.    Eyes: Negative.    Respiratory: Negative for shortness of breath.    Cardiovascular: Negative for chest pain and palpitations.   Gastrointestinal: Negative for abdominal pain, diarrhea, nausea and vomiting.   Genitourinary: Negative.     Musculoskeletal: Negative.    Skin: Negative.    Neurological: Negative.    Psychiatric/Behavioral: Negative.        PHYSICAL EXAM  ED Triage Vitals [11/14/19 1058]   Temp Heart Rate Resp BP SpO2   98.3 °F (36.8 °C) 98 -- -- 99 %      Temp src Heart Rate Source Patient Position BP Location FiO2 (%)   Tympanic -- -- -- --     Physical Exam   Constitutional: She is oriented to person, place, and time and well-developed, well-nourished, and in no distress.   HENT:   Head: Normocephalic and atraumatic.   Right Ear: External ear normal.   Left Ear: External ear normal.   Nose: Nose normal.   Eyes: Conjunctivae are normal.   Neck: Normal range of motion.   Cardiovascular: Normal rate and regular rhythm.   Pulmonary/Chest: Effort normal and breath sounds normal.   Musculoskeletal: Normal range of motion.   Neurological: She is alert and oriented to person, place, and time.   Skin: Skin is warm and dry.   Psychiatric: Affect normal.   Nursing note and vitals reviewed.      LAB RESULTS  Recent Results (from the past 24 hour(s))   Comprehensive Metabolic Panel    Collection Time: 11/14/19 11:36 AM   Result Value Ref Range    Glucose 263 (H) 65 - 99 mg/dL    BUN 28 (H) 8 - 23 mg/dL    Creatinine 1.97 (H) 0.57 - 1.00 mg/dL    Sodium 139 136 - 145 mmol/L    Potassium 5.2 3.5 - 5.2 mmol/L    Chloride 106 98 - 107 mmol/L    CO2 21.6 (L) 22.0 - 29.0 mmol/L    Calcium 9.5 8.6 - 10.5 mg/dL    Total Protein 7.5 6.0 - 8.5 g/dL    Albumin 4.40 3.50 - 5.20 g/dL    ALT (SGPT) 14 1 - 33 U/L    AST (SGOT) 19 1 - 32 U/L    Alkaline Phosphatase 136 (H) 39 - 117 U/L    Total Bilirubin 0.2 0.2 - 1.2 mg/dL    eGFR Non African Amer 25 (L) >60 mL/min/1.73    Globulin 3.1 gm/dL    A/G Ratio 1.4 g/dL    BUN/Creatinine Ratio 14.2 7.0 - 25.0    Anion Gap 11.4 5.0 - 15.0 mmol/L   CBC Auto Differential    Collection Time: 11/14/19 11:36 AM   Result Value Ref Range    WBC 9.91 3.40 - 10.80 10*3/mm3    RBC 3.49 (L) 3.77 - 5.28 10*6/mm3    Hemoglobin  "11.1 (L) 12.0 - 15.9 g/dL    Hematocrit 34.2 34.0 - 46.6 %    MCV 98.0 (H) 79.0 - 97.0 fL    MCH 31.8 26.6 - 33.0 pg    MCHC 32.5 31.5 - 35.7 g/dL    RDW 13.0 12.3 - 15.4 %    RDW-SD 45.9 37.0 - 54.0 fl    MPV 9.6 6.0 - 12.0 fL    Platelets 341 140 - 450 10*3/mm3    Neutrophil % 75.0 42.7 - 76.0 %    Lymphocyte % 13.1 (L) 19.6 - 45.3 %    Monocyte % 8.5 5.0 - 12.0 %    Eosinophil % 1.7 0.3 - 6.2 %    Basophil % 0.4 0.0 - 1.5 %    Immature Grans % 1.3 (H) 0.0 - 0.5 %    Neutrophils, Absolute 7.43 (H) 1.70 - 7.00 10*3/mm3    Lymphocytes, Absolute 1.30 0.70 - 3.10 10*3/mm3    Monocytes, Absolute 0.84 0.10 - 0.90 10*3/mm3    Eosinophils, Absolute 0.17 0.00 - 0.40 10*3/mm3    Basophils, Absolute 0.04 0.00 - 0.20 10*3/mm3    Immature Grans, Absolute 0.13 (H) 0.00 - 0.05 10*3/mm3    nRBC 0.0 0.0 - 0.2 /100 WBC       I ordered the above labs and reviewed the results    MEDICATIONS GIVEN IN ER  Medications - No data to display    EKG  Interpreted by ED Physician    PROCEDURES  Procedures      PROGRESS AND CONSULTS    Progress Notes:    ED Course as of Nov 14 1314   Thu Nov 14, 2019   1115 Potassium 5.8 on /11/08/2019 and 6.1 yesterday  [KA]      ED Course User Index  [KA] Kiana Crouch PA     11:21 AM  EKG and labs ordered.     1:04 PM  Rechecked pt who is resting in NAD. Informed pt that her potassium is WNL at 5.2 today. Plan to discharge pt with referral to nephrology. Pt understands and agrees with the plan, all questions answered.  Reviewed pt's history and workup with Dr. Best.  After a bedside evaluation; Dr Best agrees with the plan of care      Disposition vitals:  /67   Pulse 85   Temp 98.3 °F (36.8 °C) (Tympanic)   Resp 16   Ht 157.5 cm (62\")   Wt 105 kg (232 lb 1.6 oz)   SpO2 100%   BMI 42.45 kg/m²       DIAGNOSIS  Final diagnoses:   Chronic kidney disease, unspecified CKD stage       DISCHARGE    Patient discharged in stable condition.    Reviewed implications of results, diagnosis, meds, " responsibility to follow up, warning signs and symptoms of possible worsening, potential complications and reasons to return to ER.    Patient/Family voiced understanding of above instructions.    Discussed plan for discharge, as there is no emergent indication for admission. Patient referred to primary care provider for BP management due to today's BP. Pt/family is agreeable and understands need for follow up and repeat testing.  Pt is aware that discharge does not mean that nothing is wrong but it indicates no emergency is present that requires admission and they must continue care with follow-up as given below or physician of their choice.     FOLLOW-UP  Yovani Dorman MD  [address] [id number] [phone]    Schedule an appointment as soon as possible for a visit       Tello Marrufo MD  [address] [facility] [id number] [phone]    Schedule an appointment as soon as possible for a visit   [facility] - Nephrologist         Medication List      Changed    Insulin Lispro 100 UNIT/ML solution pen-injector  Commonly known as:  HUMALOG KWIKPEN  32 units at breakfast, 30 units at lunch, 36 units at supper.  Discontinue   Humalog vial  What changed:  additional instructions            Documentation assistance provided by saba Benedict for Kiana Crouch PA-C.  Information recorded by the scribe was done at my direction and has been verified and validated by me.             Ara Benedict  11/14/19 2128       Kiana Crouch PA  11/14/19 [id number]

## 2019-11-14 NOTE — ED TRIAGE NOTES
"Patient to er with c/o she had blood work yesterday and was called today and was told her potassium was high.\"   "

## 2019-11-14 NOTE — DISCHARGE INSTRUCTIONS
Follow up with the nephrologist listed above, or one of your choice, asap. Call today to schedule.  Return to the ER for palpitations, chest pain, shortness of breath, passing out, any concerns.

## 2019-11-14 NOTE — TELEPHONE ENCOUNTER
PT CALLED STATED SHE WAS LEAVING THE ER AND WOULD LIKE FOR DR. CAMPO TO REFER HER TO A KIDNEY DR.  SAID SHE AND DR. CAMPO HAD DISCUSSED THAT IN THE PAST.

## 2019-11-14 NOTE — ED PROVIDER NOTES
Pt is a 69 y.o. female who presents to the ED for an elevated potassium of 6.1 that was drawn yesterday. Pt had blood work drawn from PCP yesterday and was called today of the results and told to go to the ER. She denies any complaints here. Pt denies dizziness, vomiting, CP, palpitations, or SOA.     On exam,  Constitutional: NAD  Cardiovascular: heart is RRR, no murmur  Pulmonary: normal effort, CTAB  Abdomen: soft, nontender  Musculoskeletal: no BLE edema.   Neurological: Awake, alert    Labs and imaging reviewed.     EKG          EKG time: 1146  Rhythm/Rate: NSR, 79  P waves and SD: nml  QRS, axis: LVH   ST and T waves: normal     Interpreted Contemporaneously by me, independently viewed  No old.       Plan: Informed pt of CMP that shows a potassium of 5.2. Discussed plan to discharge home.      MD ATTESTATION NOTE    The LUDWIG and I have discussed this patient's history, physical exam, and treatment plan.  I have reviewed the documentation and personally had a face to face interaction with the patient. I affirm the documentation and agree with the treatment and plan.  The attached note describes my personal findings.      Documentation assistance provided by saba Arroyo for Dr. Best. Information recorded by the scribe was done at my direction and has been verified and validated by me.             Cole Arroyo  11/14/19 3853       Stewart Best MD  11/14/19 7469

## 2019-11-14 NOTE — TELEPHONE ENCOUNTER
11/14 called and sw pt this morning told her potassium is elevated and that she needs to go to the ER @ [facility] to get evaluated and treatment per dr Dorman. Called triage @ the hospital to let them know the pt will be coming in

## 2019-11-26 ENCOUNTER — TELEPHONE (OUTPATIENT)
Dept: ENDOCRINOLOGY | Age: 69
End: 2019-11-26

## 2019-11-26 RX ORDER — INSULIN LISPRO 100 [IU]/ML
INJECTION, SOLUTION INTRAVENOUS; SUBCUTANEOUS
Start: 2019-11-26

## 2019-11-26 NOTE — TELEPHONE ENCOUNTER
I returned patients call. She said Sunday she was at [facility] and she bottomed out and she was passed out for 3 to 4 minutes and not coherent. She had no signs  EMS was called and niece gave her candy bar  and ensure and fire dept checked her blood sugar and it was 28.  EMS came in and she drank orange juice. Blood sugar was then 68.   Patient did not want to go to hospital. EMS  Her blood sugar went up to 69.    She said her blood sugar was 153 and she ate a bowl of cereal that morning.     She takes Humalog, 32 units breakfast dinner, 30 units lunch.but said she only eats 1 to 2 xday.  Lantus 70 unjits 1xday at bedtime     She asked if Dr. Dorman will prescribe the latest model to put her on arm to let her know when blood sugar gets low.   [facility]   Pt - [phone] asked her call back

## 2019-11-26 NOTE — TELEPHONE ENCOUNTER
11/26 called amd sw pt told her to drop the humalog to 25 units with each meal / told her she does not qualify for the dexcom she needs to test 4 x day

## 2019-12-09 RX ORDER — PANTOPRAZOLE SODIUM 40 MG/1
TABLET, DELAYED RELEASE ORAL
Qty: 90 TABLET | Refills: 1 | Status: SHIPPED | OUTPATIENT
Start: 2019-12-09

## 2019-12-09 RX ORDER — DULOXETIN HYDROCHLORIDE 60 MG/1
CAPSULE, DELAYED RELEASE ORAL
Qty: 90 CAPSULE | Refills: 1 | Status: SHIPPED | OUTPATIENT
Start: 2019-12-09

## 2020-01-09 ENCOUNTER — TELEPHONE (OUTPATIENT)
Dept: ENDOCRINOLOGY | Age: 70
End: 2020-01-09

## 2020-01-31 RX ORDER — FLUCONAZOLE 150 MG/1
150 TABLET ORAL ONCE
Qty: 1 TABLET | Refills: 0 | Status: SHIPPED | OUTPATIENT
Start: 2020-01-31 | End: 2020-01-31 | Stop reason: SDUPTHER

## 2020-01-31 RX ORDER — FLUCONAZOLE 150 MG/1
150 TABLET ORAL ONCE
Qty: 1 TABLET | Refills: 0 | Status: SHIPPED | OUTPATIENT
Start: 2020-01-31 | End: 2020-01-31

## 2020-02-27 DIAGNOSIS — I10 ESSENTIAL HYPERTENSION: ICD-10-CM

## 2020-02-27 DIAGNOSIS — E13.9 LATENT AUTOIMMUNE DIABETES MELLITUS IN ADULTS (HCC): ICD-10-CM

## 2020-02-27 DIAGNOSIS — E78.49 OTHER HYPERLIPIDEMIA: Primary | ICD-10-CM

## 2020-02-27 DIAGNOSIS — R68.89 ABNORMAL ENDOCRINE LABORATORY TEST FINDING: ICD-10-CM

## 2020-02-27 DIAGNOSIS — E55.9 VITAMIN D DEFICIENCY: ICD-10-CM

## 2020-03-03 NOTE — PROGRESS NOTES
Subjective   Eddie D Parmer is a 69 y.o. female.     F/u for ANNI, hyperlipidemia, hypertension, depression, vitamin d def/ testing bs 1-2  x day / last dm eye exam 2019 @ [facility]/ last dm foot exam today with dr Dorman / flu vaccine @ [facility]      Patient is a 69-year-old female came in for follow-up. She has known diabetes mellitus and has elevated MATT antibody. She was started on insulin in 2013 and is on Lantus 25 units every evening and Humalog 25 units at breakfast, 25 units at lunch, and 25 units at supper.  Fasting glucose 221-250.  Random glucose .  She denies severe hypoglycemic episodes.  She has lost 22 pounds since 11/19.       Her last eye examination was in 2/20 and has bleeding on left eye and last left eye injection in 2/20 .  She has intermittent numbness on left 5th finger.  She is on gabapentin 600 mg BID.  She has no microalbuminuria on urine sample taken last 11/19.      She has degenerative spondylolisthesis disease with spinal stenosis and degenerative scoliosis.  She has been seeing Dr. Sheth and Dr. Delgado for possible spine surgery.     She has bilateral degenerative arthritis of knees and hips and was advised knee replacement by Dr. Gambino in the past.  She is seeing Dr. Martinez for pain management and is on oxycodone and meloxicam.  Her last epidural and right hip injection was in 1/19.    She had right hip replacement at [facility] in February 2020 without complication.     She has hypertension and has been on amlodipine 10 mg/day, hydralazine 25 mg TID and carvedilol 6.25 mg twice daily.  She takes torsemide 20 mg 1-2 tab/day as needed for edema.   She denies any chest pain, or orthopnea.       She has hyperlipidemia and has been on pravastatin 80 mg once a day. She denies any myalgia.       She has acid reflux disease which is controlled by Protonix. She has been off Reglan. She was seen by Dr. Britton in the past.     She has vitamin D deficiency and has not been taking vitamin D3  "2000 units/day.     The following portions of the patient's history were reviewed and updated as appropriate: allergies, current medications, past family history, past medical history, past social history, past surgical history and problem list.    Review of Systems   HENT: Negative.    Eyes: Negative.    Respiratory: Negative for chest tightness and shortness of breath.    Cardiovascular: Negative.  Negative for chest pain and palpitations.   Gastrointestinal: Negative.    Genitourinary: Negative.    Musculoskeletal: Positive for arthralgias. Negative for myalgias.   Neurological: Negative for dizziness and weakness.   Hematological: Negative for adenopathy. Does not bruise/bleed easily.     Objective      Vitals:    03/11/20 1431   BP: 144/76   BP Location: Right arm   Patient Position: Sitting   Cuff Size: Large Adult   Pulse: 78   SpO2: 97%   Weight: 93.5 kg (206 lb 3.2 oz)   Height: 157.5 cm (62.01\")     Physical Exam   Constitutional: She is oriented to person, place, and time. She appears well-developed and well-nourished. No distress.   HENT:   Head: Normocephalic.   Right Ear: External ear normal.   Left Ear: External ear normal.   Nose: Nose normal.   Mouth/Throat: Oropharynx is clear and moist. No oropharyngeal exudate.   Eyes: Conjunctivae and EOM are normal. Right eye exhibits no discharge. Left eye exhibits no discharge. No scleral icterus.   Neck: Neck supple. No JVD present. No tracheal deviation present. No thyromegaly present.   Cardiovascular: Normal rate, regular rhythm, normal heart sounds and intact distal pulses. Exam reveals no gallop and no friction rub.   No murmur heard.  Pulmonary/Chest: Effort normal and breath sounds normal. No stridor. No respiratory distress. She has no wheezes. She has no rales. She exhibits no tenderness.   Abdominal: Soft. Bowel sounds are normal. She exhibits no distension and no mass. There is no tenderness. There is no guarding.   Musculoskeletal: Normal range " of motion. She exhibits no edema, tenderness or deformity.   Lymphadenopathy:     She has no cervical adenopathy.   Neurological: She is alert and oriented to person, place, and time. She displays normal reflexes.   Skin: Skin is warm and dry. She is not diaphoretic. No erythema. No pallor.   Psychiatric: She has a normal mood and affect. Her behavior is normal.     Admission on 11/14/2019, Discharged on 11/14/2019   Component Date Value Ref Range Status   • Glucose 11/14/2019 263* 65 - 99 mg/dL Final   • BUN 11/14/2019 28* 8 - 23 mg/dL Final   • Creatinine 11/14/2019 1.97* 0.57 - 1.00 mg/dL Final   • Sodium 11/14/2019 139  136 - 145 mmol/L Final   • Potassium 11/14/2019 5.2  3.5 - 5.2 mmol/L Final   • Chloride 11/14/2019 106  98 - 107 mmol/L Final   • CO2 11/14/2019 21.6* 22.0 - 29.0 mmol/L Final   • Calcium 11/14/2019 9.5  8.6 - 10.5 mg/dL Final   • Total Protein 11/14/2019 7.5  6.0 - 8.5 g/dL Final   • Albumin 11/14/2019 4.40  3.50 - 5.20 g/dL Final   • ALT (SGPT) 11/14/2019 14  1 - 33 U/L Final   • AST (SGOT) 11/14/2019 19  1 - 32 U/L Final   • Alkaline Phosphatase 11/14/2019 136* 39 - 117 U/L Final   • Total Bilirubin 11/14/2019 0.2  0.2 - 1.2 mg/dL Final   • eGFR Non African Amer 11/14/2019 25* >60 mL/min/1.73 Final   • Globulin 11/14/2019 3.1  gm/dL Final   • A/G Ratio 11/14/2019 1.4  g/dL Final   • BUN/Creatinine Ratio 11/14/2019 14.2  7.0 - 25.0 Final   • Anion Gap 11/14/2019 11.4  5.0 - 15.0 mmol/L Final   • WBC 11/14/2019 9.91  3.40 - 10.80 10*3/mm3 Final   • RBC 11/14/2019 3.49* 3.77 - 5.28 10*6/mm3 Final   • Hemoglobin 11/14/2019 11.1* 12.0 - 15.9 g/dL Final   • Hematocrit 11/14/2019 34.2  34.0 - 46.6 % Final   • MCV 11/14/2019 98.0* 79.0 - 97.0 fL Final   • MCH 11/14/2019 31.8  26.6 - 33.0 pg Final   • MCHC 11/14/2019 32.5  31.5 - 35.7 g/dL Final   • RDW 11/14/2019 13.0  12.3 - 15.4 % Final   • RDW-SD 11/14/2019 45.9  37.0 - 54.0 fl Final   • MPV 11/14/2019 9.6  6.0 - 12.0 fL Final   • Platelets  11/14/2019 341  140 - 450 10*3/mm3 Final   • Neutrophil % 11/14/2019 75.0  42.7 - 76.0 % Final   • Lymphocyte % 11/14/2019 13.1* 19.6 - 45.3 % Final   • Monocyte % 11/14/2019 8.5  5.0 - 12.0 % Final   • Eosinophil % 11/14/2019 1.7  0.3 - 6.2 % Final   • Basophil % 11/14/2019 0.4  0.0 - 1.5 % Final   • Immature Grans % 11/14/2019 1.3* 0.0 - 0.5 % Final   • Neutrophils, Absolute 11/14/2019 7.43* 1.70 - 7.00 10*3/mm3 Final   • Lymphocytes, Absolute 11/14/2019 1.30  0.70 - 3.10 10*3/mm3 Final   • Monocytes, Absolute 11/14/2019 0.84  0.10 - 0.90 10*3/mm3 Final   • Eosinophils, Absolute 11/14/2019 0.17  0.00 - 0.40 10*3/mm3 Final   • Basophils, Absolute 11/14/2019 0.04  0.00 - 0.20 10*3/mm3 Final   • Immature Grans, Absolute 11/14/2019 0.13* 0.00 - 0.05 10*3/mm3 Final   • nRBC 11/14/2019 0.0  0.0 - 0.2 /100 WBC Final     Assessment/Plan   Jer was seen today for nitin, hyperlipidemia, hypertension, vitamin d deficiency and depression.    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  Continue Humalog 25 units with each meal.  Continue pravastatin 80 mg/day.  Continue amlodipine, carvedilol, and hydralazine.  Continue torsemide as needed.  Continue Protonix.  Restart vitamin D3 2000 units/day.    RTC 4 mos.

## 2020-03-04 ENCOUNTER — RESULTS ENCOUNTER (OUTPATIENT)
Dept: ENDOCRINOLOGY | Age: 70
End: 2020-03-04

## 2020-03-04 DIAGNOSIS — E13.9 LATENT AUTOIMMUNE DIABETES MELLITUS IN ADULTS (HCC): ICD-10-CM

## 2020-03-04 DIAGNOSIS — E78.49 OTHER HYPERLIPIDEMIA: ICD-10-CM

## 2020-03-04 DIAGNOSIS — I10 ESSENTIAL HYPERTENSION: ICD-10-CM

## 2020-03-04 DIAGNOSIS — R68.89 ABNORMAL ENDOCRINE LABORATORY TEST FINDING: ICD-10-CM

## 2020-03-04 DIAGNOSIS — E55.9 VITAMIN D DEFICIENCY: ICD-10-CM

## 2020-03-05 LAB
25(OH)D3+25(OH)D2 SERPL-MCNC: 25.9 NG/ML (ref 30–100)
ALBUMIN SERPL-MCNC: 4.2 G/DL (ref 3.5–5.2)
ALBUMIN/GLOB SERPL: 1.4 G/DL
ALP SERPL-CCNC: 161 U/L (ref 39–117)
ALT SERPL-CCNC: 6 U/L (ref 1–33)
AST SERPL-CCNC: 14 U/L (ref 1–32)
BILIRUB SERPL-MCNC: 0.4 MG/DL (ref 0.2–1.2)
BUN SERPL-MCNC: 14 MG/DL (ref 8–23)
BUN/CREAT SERPL: 8.5 (ref 7–25)
CALCIUM SERPL-MCNC: 9.5 MG/DL (ref 8.6–10.5)
CHLORIDE SERPL-SCNC: 100 MMOL/L (ref 98–107)
CHOLEST SERPL-MCNC: 145 MG/DL (ref 0–200)
CO2 SERPL-SCNC: 22 MMOL/L (ref 22–29)
CREAT SERPL-MCNC: 1.65 MG/DL (ref 0.57–1)
GLOBULIN SER CALC-MCNC: 2.9 GM/DL
GLUCOSE SERPL-MCNC: 191 MG/DL (ref 65–99)
HBA1C MFR BLD: 6.8 % (ref 4.8–5.6)
HDLC SERPL-MCNC: 58 MG/DL (ref 40–60)
INTERPRETATION: NORMAL
LDLC SERPL CALC-MCNC: 68 MG/DL (ref 0–100)
Lab: NORMAL
POTASSIUM SERPL-SCNC: 4.4 MMOL/L (ref 3.5–5.2)
PROT SERPL-MCNC: 7.1 G/DL (ref 6–8.5)
SODIUM SERPL-SCNC: 137 MMOL/L (ref 136–145)
TRIGL SERPL-MCNC: 97 MG/DL (ref 0–150)
TSH SERPL DL<=0.005 MIU/L-ACNC: 1.56 UIU/ML (ref 0.27–4.2)
VLDLC SERPL CALC-MCNC: 19.4 MG/DL

## 2020-03-11 ENCOUNTER — OFFICE VISIT (OUTPATIENT)
Dept: ENDOCRINOLOGY | Age: 70
End: 2020-03-11

## 2020-03-11 VITALS
WEIGHT: 206.2 LBS | HEART RATE: 78 BPM | HEIGHT: 62 IN | OXYGEN SATURATION: 97 % | DIASTOLIC BLOOD PRESSURE: 76 MMHG | SYSTOLIC BLOOD PRESSURE: 144 MMHG | BODY MASS INDEX: 37.94 KG/M2

## 2020-03-11 DIAGNOSIS — E78.5 HYPERLIPIDEMIA, UNSPECIFIED HYPERLIPIDEMIA TYPE: ICD-10-CM

## 2020-03-11 DIAGNOSIS — I10 ESSENTIAL HYPERTENSION: ICD-10-CM

## 2020-03-11 DIAGNOSIS — E13.9 LATENT AUTOIMMUNE DIABETES MELLITUS IN ADULTS (HCC): Primary | ICD-10-CM

## 2020-03-11 DIAGNOSIS — E55.9 VITAMIN D DEFICIENCY: ICD-10-CM

## 2020-03-11 PROCEDURE — 99214 OFFICE O/P EST MOD 30 MIN: CPT | Performed by: INTERNAL MEDICINE

## 2020-03-11 RX ORDER — OXYCODONE AND ACETAMINOPHEN 10; 325 MG/1; MG/1
1 TABLET ORAL
COMMUNITY

## 2020-03-11 RX ORDER — ROSUVASTATIN CALCIUM 5 MG/1
TABLET, COATED ORAL
COMMUNITY
Start: 2020-02-28 | End: 2020-03-11

## 2020-03-23 DIAGNOSIS — G62.9 PERIPHERAL POLYNEUROPATHY: Primary | ICD-10-CM

## 2020-03-23 RX ORDER — GABAPENTIN 600 MG/1
600 TABLET ORAL 2 TIMES DAILY
Qty: 180 TABLET | Refills: 0 | Status: SHIPPED | OUTPATIENT
Start: 2020-03-23 | End: 2021-03-23

## 2020-04-08 RX ORDER — LANCETS 33 GAUGE
EACH MISCELLANEOUS
Qty: 200 EACH | Refills: 0 | Status: SHIPPED | OUTPATIENT
Start: 2020-04-08

## 2020-04-18 DIAGNOSIS — R80.9 MICROALBUMINURIA: ICD-10-CM

## 2020-04-18 DIAGNOSIS — K31.84 GASTROPARESIS: ICD-10-CM

## 2020-04-18 DIAGNOSIS — E78.5 HYPERLIPIDEMIA, UNSPECIFIED HYPERLIPIDEMIA TYPE: ICD-10-CM

## 2020-04-18 DIAGNOSIS — M17.0 OSTEOARTHRITIS OF BOTH KNEES, UNSPECIFIED OSTEOARTHRITIS TYPE: ICD-10-CM

## 2020-04-18 DIAGNOSIS — E13.9 LATENT AUTOIMMUNE DIABETES MELLITUS IN ADULTS (HCC): ICD-10-CM

## 2020-04-18 DIAGNOSIS — K21.9 GASTROESOPHAGEAL REFLUX DISEASE WITHOUT ESOPHAGITIS: ICD-10-CM

## 2020-04-18 DIAGNOSIS — I10 ESSENTIAL HYPERTENSION: ICD-10-CM

## 2020-04-18 DIAGNOSIS — E55.9 VITAMIN D DEFICIENCY: ICD-10-CM

## 2020-04-18 DIAGNOSIS — M47.896 OTHER OSTEOARTHRITIS OF SPINE, LUMBAR REGION: ICD-10-CM

## 2020-04-20 RX ORDER — MONTELUKAST SODIUM 10 MG/1
TABLET ORAL
Qty: 90 TABLET | Refills: 3 | Status: SHIPPED | OUTPATIENT
Start: 2020-04-20

## 2020-04-27 ENCOUNTER — TELEPHONE (OUTPATIENT)
Dept: ENDOCRINOLOGY | Age: 70
End: 2020-04-27

## 2020-04-28 RX ORDER — AMLODIPINE BESYLATE 10 MG/1
10 TABLET ORAL DAILY
Qty: 90 TABLET | Refills: 1 | Status: SHIPPED | OUTPATIENT
Start: 2020-04-28 | End: 2020-04-28 | Stop reason: SDUPTHER

## 2020-04-28 RX ORDER — CARVEDILOL 6.25 MG/1
6.25 TABLET ORAL 2 TIMES DAILY
Qty: 90 TABLET | Refills: 1 | Status: SHIPPED | OUTPATIENT
Start: 2020-04-28 | End: 2020-04-28 | Stop reason: SDUPTHER

## 2020-04-28 RX ORDER — CARVEDILOL 6.25 MG/1
6.25 TABLET ORAL 2 TIMES DAILY
Qty: 30 TABLET | Refills: 5 | Status: SHIPPED | OUTPATIENT
Start: 2020-04-28 | End: 2020-07-24

## 2020-04-28 RX ORDER — AMLODIPINE BESYLATE 10 MG/1
10 TABLET ORAL DAILY
Qty: 30 TABLET | Refills: 5 | Status: SHIPPED | OUTPATIENT
Start: 2020-04-28

## 2020-05-28 RX ORDER — PRAVASTATIN SODIUM 80 MG/1
TABLET ORAL
Qty: 90 TABLET | Refills: 1 | Status: SHIPPED | OUTPATIENT
Start: 2020-05-28

## 2020-06-18 RX ORDER — HYDRALAZINE HYDROCHLORIDE 25 MG/1
TABLET, FILM COATED ORAL
Qty: 90 TABLET | Refills: 5 | Status: SHIPPED | OUTPATIENT
Start: 2020-06-18

## 2020-07-24 RX ORDER — CARVEDILOL 6.25 MG/1
TABLET ORAL
Qty: 30 TABLET | Refills: 5 | Status: SHIPPED | OUTPATIENT
Start: 2020-07-24

## 2025-03-31 NOTE — TELEPHONE ENCOUNTER
Called and philip pt told her to call the nephrologies and have them fax an order for the labs with the dx codes/ told her as far as the scooter we do not prescribe them that she needs to call the surgeon and have them fill them out    Pharmacy faxed in a request for prior authorization on:    Medication: ondansetron   Dosage: 4mg  Signature:  DISSOLVE 1 TABLET ON THE TONGUE EVERY 8 HOURS AS NEEDED FOR NAUSEA   Quantity requested:  Disp-30 tablet, R-0   Form Requested: no  ID Number:  [id number]  Insurance Phone Number: [phone]    Preferred pharmacy has been set up and verified.    Preferred contact number#  other [phone] pharm fax